# Patient Record
Sex: MALE | Race: WHITE | NOT HISPANIC OR LATINO | Employment: FULL TIME | URBAN - METROPOLITAN AREA
[De-identification: names, ages, dates, MRNs, and addresses within clinical notes are randomized per-mention and may not be internally consistent; named-entity substitution may affect disease eponyms.]

---

## 2018-02-27 ENCOUNTER — DOCUMENTATION ONLY (OUTPATIENT)
Dept: INTERNAL MEDICINE | Facility: CLINIC | Age: 28
End: 2018-02-27

## 2018-02-27 ENCOUNTER — OFFICE VISIT (OUTPATIENT)
Dept: INTERNAL MEDICINE | Facility: CLINIC | Age: 28
End: 2018-02-27
Payer: COMMERCIAL

## 2018-02-27 ENCOUNTER — DOCUMENTATION ONLY (OUTPATIENT)
Dept: LAB | Facility: CLINIC | Age: 28
End: 2018-02-27

## 2018-02-27 VITALS
RESPIRATION RATE: 16 BRPM | HEIGHT: 69 IN | HEART RATE: 64 BPM | BODY MASS INDEX: 27.85 KG/M2 | WEIGHT: 188 LBS | OXYGEN SATURATION: 97 % | DIASTOLIC BLOOD PRESSURE: 79 MMHG | SYSTOLIC BLOOD PRESSURE: 128 MMHG

## 2018-02-27 DIAGNOSIS — M13.0 POLYARTHRITIS: ICD-10-CM

## 2018-02-27 DIAGNOSIS — M13.0 POLYARTHRITIS: Primary | ICD-10-CM

## 2018-02-27 LAB
ALBUMIN SERPL-MCNC: 4.3 G/DL (ref 3.4–5)
ALP SERPL-CCNC: 68 U/L (ref 40–150)
ALT SERPL W P-5'-P-CCNC: 46 U/L (ref 0–70)
ANION GAP SERPL CALCULATED.3IONS-SCNC: 6 MMOL/L (ref 3–14)
AST SERPL W P-5'-P-CCNC: 28 U/L (ref 0–45)
BASOPHILS # BLD AUTO: 0 10E9/L (ref 0–0.2)
BASOPHILS NFR BLD AUTO: 0.6 %
BILIRUB SERPL-MCNC: 0.9 MG/DL (ref 0.2–1.3)
BUN SERPL-MCNC: 20 MG/DL (ref 7–30)
CALCIUM SERPL-MCNC: 9.2 MG/DL (ref 8.5–10.1)
CHLORIDE SERPL-SCNC: 103 MMOL/L (ref 94–109)
CK SERPL-CCNC: 214 U/L (ref 30–300)
CO2 SERPL-SCNC: 27 MMOL/L (ref 20–32)
CREAT SERPL-MCNC: 0.9 MG/DL (ref 0.66–1.25)
CRP SERPL-MCNC: 6 MG/L (ref 0–8)
DIFFERENTIAL METHOD BLD: NORMAL
EOSINOPHIL # BLD AUTO: 0 10E9/L (ref 0–0.7)
EOSINOPHIL NFR BLD AUTO: 0 %
ERYTHROCYTE [DISTWIDTH] IN BLOOD BY AUTOMATED COUNT: 11.4 % (ref 10–15)
ERYTHROCYTE [SEDIMENTATION RATE] IN BLOOD BY WESTERGREN METHOD: 7 MM/H (ref 0–15)
GFR SERPL CREATININE-BSD FRML MDRD: >90 ML/MIN/1.7M2
GLUCOSE SERPL-MCNC: 120 MG/DL (ref 70–99)
HCT VFR BLD AUTO: 45.4 % (ref 40–53)
HGB BLD-MCNC: 15.2 G/DL (ref 13.3–17.7)
IMM GRANULOCYTES # BLD: 0 10E9/L (ref 0–0.4)
IMM GRANULOCYTES NFR BLD: 0.3 %
LYMPHOCYTES # BLD AUTO: 1.6 10E9/L (ref 0.8–5.3)
LYMPHOCYTES NFR BLD AUTO: 25.2 %
MCH RBC QN AUTO: 32.8 PG (ref 26.5–33)
MCHC RBC AUTO-ENTMCNC: 33.5 G/DL (ref 31.5–36.5)
MCV RBC AUTO: 98 FL (ref 78–100)
MONOCYTES # BLD AUTO: 0.5 10E9/L (ref 0–1.3)
MONOCYTES NFR BLD AUTO: 8.6 %
NEUTROPHILS # BLD AUTO: 4.1 10E9/L (ref 1.6–8.3)
NEUTROPHILS NFR BLD AUTO: 65.3 %
NRBC # BLD AUTO: 0 10*3/UL
NRBC BLD AUTO-RTO: 0 /100
PLATELET # BLD AUTO: 187 10E9/L (ref 150–450)
POTASSIUM SERPL-SCNC: 3.5 MMOL/L (ref 3.4–5.3)
PROT SERPL-MCNC: 7.8 G/DL (ref 6.8–8.8)
RBC # BLD AUTO: 4.63 10E12/L (ref 4.4–5.9)
SODIUM SERPL-SCNC: 137 MMOL/L (ref 133–144)
URATE SERPL-MCNC: 4.7 MG/DL (ref 3.5–7.2)
WBC # BLD AUTO: 6.3 10E9/L (ref 4–11)

## 2018-02-27 RX ORDER — SULFASALAZINE 500 MG/1
1000 TABLET ORAL 3 TIMES DAILY PRN
COMMUNITY
End: 2018-02-27

## 2018-02-27 RX ORDER — SULFASALAZINE 500 MG/1
1000 TABLET ORAL 3 TIMES DAILY PRN
Qty: 90 TABLET | Refills: 1 | Status: SHIPPED | OUTPATIENT
Start: 2018-02-27 | End: 2018-04-10

## 2018-02-27 RX ORDER — DICLOFENAC SODIUM 25 MG/1
75 TABLET, DELAYED RELEASE ORAL 2 TIMES DAILY PRN
Qty: 60 TABLET | Refills: 1 | Status: SHIPPED | OUTPATIENT
Start: 2018-02-27 | End: 2018-04-10

## 2018-02-27 ASSESSMENT — PAIN SCALES - GENERAL: PAINLEVEL: MILD PAIN (3)

## 2018-02-27 NOTE — MR AVS SNAPSHOT
After Visit Summary   2/27/2018    Jermaine Acosta    MRN: [id number]           Patient Information     Date Of Birth          1990        Visit Information        Provider Department      2/27/2018 8:50 AM Sriram Cormier DO M [facility]        Today's Diagnoses     Polyarthritis    -  1      Care Instructions    1. Labs today. Please go to the lab on the first floor before you leave today.   2. Rheumatology referral. Rheumatology [phone] ([facility], 3rd Floor W)  3. Follow up in 1 month. [phone]          Follow-ups after your visit        Additional Services     RHEUMATOLOGY REFERRAL       Your provider has referred you to: PREFERRED PROVIDERS:    Please be aware that coverage of these services is subject to the terms and limitations of your health insurance plan.  Call member services at your health plan with any benefit or coverage questions.      Please bring the following with you to your appointment:    (1) Any X-Rays, CTs or MRIs which have been performed.  Contact the facility where they were done to arrange for  prior to your scheduled appointment.    (2) List of current medications   (3) This referral request   (4) Any documents/labs given to you for this referral                  Follow-up notes from your care team     Return in about 1 month (around 3/27/2018).      Your next 10 appointments already scheduled     Feb 27, 2018 10:30 AM CST   LAB with [facility] ([facility])    [address] [place] [address] [phone]           Please do not eat 10-12 hours before your appointment if you are coming in fasting for labs on lipids, cholesterol, or glucose (sugar). This does not apply to pregnant women. Water, hot tea and black coffee (with nothing added) are okay. Do not drink other fluids, diet soda or chew gum.            Apr 10, 2018  8:50 AM CDT   (Arrive by 8:35 AM)   Return Visit with Sriram  DO Genia   [facility] ([facility])    [address]  4th Floor  [facility] [address] [phone]              Future tests that were ordered for you today     Open Future Orders        Priority Expected Expires Ordered    CBC with platelets differential Routine 2/27/2018 3/13/2018 2/27/2018    Comprehensive metabolic panel Routine 2/27/2018 3/13/2018 2/27/2018    CK total Routine 2/27/2018 2/27/2019 2/27/2018    Rheumatoid factor Routine 2/27/2018 2/27/2019 2/27/2018    Cyclic Citrullinated Peptide Antibody IgG Routine 2/27/2018 2/27/2019 2/27/2018    Erythrocyte sedimentation rate Routine 2/27/2018 2/27/2019 2/27/2018    CRP inflammation Routine 2/27/2018 2/27/2019 2/27/2018    Uric acid Routine 2/27/2018 2/27/2019 2/27/2018            Who to contact     Please call your clinic at [phone] to:    Ask questions about your health    Make or cancel appointments    Discuss your medicines    Learn about your test results    Speak to your doctor            Additional Information About Your Visit        [employer] Information     [employer] gives you secure access to your electronic health record. If you see a primary care provider, you can also send messages to your care team and make appointments. If you have questions, please call your primary care clinic.  If you do not have a primary care provider, please call [phone] and they will assist you.      [employer] is an electronic gateway that provides easy, online access to your medical records. With [employer], you can request a clinic appointment, read your test results, renew a prescription or communicate with your care team.     To access your existing account, please contact your [facility] or call [phone] for assistance.        Care EveryWhere ID     This is your Care EveryWhere ID. This could be used by other organizations to access your [facility]  "records  [id number]        Your Vitals Were     Pulse Respirations Height Pulse Oximetry BMI (Body Mass Index)       64 16 1.759 m (5' 9.25\") 97% 27.56 kg/m2        Blood Pressure from Last 3 Encounters:   02/27/18 128/79    Weight from Last 3 Encounters:   02/27/18 85.3 kg (188 lb)              We Performed the Following     RHEUMATOLOGY REFERRAL          Where to get your medicines      These medications were sent to [place], [place] - [address]  [address], [facility] [address]    Hours:  TRANSPLANT PHONE NUMBER [phone] Phone:  [phone]     diclofenac 25 MG EC tablet    sulfaSALAzine 500 MG tablet          Primary Care Provider Office Phone # Fax #    Sriram Cormier [phone]       [facility] [address] [facility] [address]  [facility] [address]        Equal Access to Services     RIVKA LOCKWOOD AH: Hadii aad ku hadasho Soomaali, waaxda luqadaha, qaybta kaalmada adeegyada, waxay idiin hayaan shelby keita. So [facility] [phone].    ATENCIÓN: Si judsonla espriley, tiene a zacarias disposición servicios gratuitos de asistencia lingüística. Llame al [phone].    We comply with applicable federal civil rights laws and [place] laws. We do not discriminate on the basis of race, color, national origin, age, disability, sex, sexual orientation, or gender identity.            Thank you!     Thank you for choosing [facility]  for your care. Our goal is always to provide you with excellent care. Hearing back from our patients is one way we can continue to improve our services. Please take a few minutes to complete the written survey that you may receive in the mail after your visit with us. Thank you!             Your Updated Medication List - Protect others around you: Learn how to safely use, store and throw away your medicines at www.disposemymeds.org.          This list is accurate as of 2/27/18 10:19 AM.  " Always use your most recent med list.                   Brand Name Dispense Instructions for use Diagnosis    diclofenac 25 MG EC tablet    VOLTAREN    60 tablet    Take 3 tablets (75 mg) by mouth 2 times daily as needed for moderate pain 75 mg DR tablets    Polyarthritis       sulfaSALAzine 500 MG tablet    AZULFIDINE    90 tablet    Take 2 tablets (1,000 mg) by mouth 3 times daily as needed    Polyarthritis

## 2018-02-27 NOTE — OR NURSING
Rapid Response Epic Documentation     Situation:  Vasovagal with blood draw.    Objective: Upon RN arrival, the patient was already transferred to a recliner, alert, oriented, slightly pale, diaphoretic. Staff already gave patient orange juice and he reports feeling much better.    Assessment: Pulse: 64, BP: 128/75 right arm, O2: 96 % RA.    Plan: Hydrate and eat before future lab draws if allowed. Recline during lab draw.    Location: [facility] 1st floor    Disposition:      Stable (D/C home)    Protocol Used:     none

## 2018-02-27 NOTE — PATIENT INSTRUCTIONS
1. Labs today. Please go to the lab on the first floor before you leave today.   2. Rheumatology referral. Rheumatology [phone] ([facility], 3rd Floor W)  3. Follow up in 1 month. [phone]

## 2018-02-27 NOTE — NURSING NOTE
Chief Complaint   Patient presents with     Physical     Patient is here for annual physical     Cristina Ribeiro CMA 8:46 AM on 2/27/2018.

## 2018-02-27 NOTE — PROGRESS NOTES
[facility] Note  Date of Service: February 27, 2018    Patient: Jermaine Acosta  MRN: [id number]  PCP: Sriram Cormier    Reason for visit: Joint pains     Subjective  Jermaine Acosta is a 27 year old male with history of seronegative spondylarthritis that presents to establish care.  Patient recently moved from [place], [place] in August 2017.  He reports a history of polyarthritis starting in the spring 2013.  His first symptom was right thumb swelling, which progressed to swelling and pain of his bilateral wrists, MCP joints, and PIP joints.  He has also had intermittent right elbow swelling, as well as left MTP pain and swelling.  He denies any history of hip, knee, ankle, or SI joint pains.  Patient has been following with a rheumatologist at [facility], Dr. Patricia Omer, and has been maintained on diclofenac 75 mg twice daily and sulfasalazine 1000 mg 3 times daily for presumed seronegative spondylarthritis.  Patient reports he is also being worked up for possible psoriasis.  This workup was being done by Dr. Arash Daley at [facility].  Today, the patient reports that his joint symptoms are overall well controlled.  He is requesting refills of his diclofenac and sulfasalazine.  The patient has also not yet established with a rheumatologist here, and is requesting a referral.  He has no other concerns or complaints.    The patient brought in some records to his clinic appointment today.  These records are from 2015.  Lab testing from 2015 showed WBC 4.2, hemoglobin 14.5, platelets 170, MCV 96.  ESR 3 and CRP 5.9.  His renal function and liver function tests were normal in 2015.  He has a previous negative ERNST, RF, anti-CCP antibody, negative Lyme antibody, and negative HLA-B27 testing.    No past medical history on file.    Past Surgical History:   Procedure Laterality Date     right ankle fracture       wisdom teeth removal         Current Outpatient Prescriptions  "  Medication Sig Dispense Refill     DICLOFENAC SODIUM PO Take 75 mg by mouth 2 times daily as needed for moderate pain 75 mg DR tablets       sulfaSALAzine (AZULFIDINE) 500 MG tablet Take 1,000 mg by mouth 3 times daily as needed         Family History   Problem Relation Age of Onset     Rheumatoid Arthritis Father        Social History     Social History     Marital status:      Spouse name: N/A     Number of children: N/A     Years of education: N/A     Occupational History     Not on file.     Social History Main Topics     Smoking status: Never Smoker     Smokeless tobacco: Never Used     Alcohol use Not on file     Drug use: Not on file     Sexual activity: Not on file     Other Topics Concern     Not on file     Social History Narrative     No narrative on file       Review of Systems  Constitutional: No fevers, chills, night sweats, or weight changes.  HEENT: No hearing changes, sore throat, runny nose, or sinus problems. No sicca symptoms or oral ulcers.   CV: No chest pain, palpitations, or leg swelling.  RESP: No shortness of breath, cough, or wheezing.  GI: No dyspepsia, nausea, vomiting, diarrhea, constipation, or abdominal pain.  : No hematuria, dysuria, or urgency.  MSK: As above.   SKIN: +Dry skin. No rashes, hair loss, or itching. No raynaud's.   NEURO: No headaches, weakness, vision changes, or paraesthesias.   ENDO: No temperature intolerance.  HEME: No bleeding, bruising, or lymphadenopathy.  Psych: No mood changes.     Objective  /79 (BP Location: Right arm, Patient Position: Sitting, Cuff Size: Adult Large)  Pulse 64  Resp 16  Ht 1.759 m (5' 9.25\")  Wt 85.3 kg (188 lb)  SpO2 97%  BMI 27.56 kg/m2    Physical Exam  General: Sitting in the chair, conversing normally, NAD, appears stated age. Non-toxic appearing.   HEENT: NC/AT, MMM, PERRL, EOMs intact, sclera anicteric. Oropharynx appears clear.  Neck: Supple, no LAD, no JVD.   Resp: Non-labored. CTAB, no wheezes or crackles. "   CV: RRR, no MRG.   Abd: Soft, NT/ND, no guarding/rebound.   Ext: No LE edema. Radial pulses +2 and equal bilaterally. Swelling/erythema of R 1st MCP/PIP, 2nd MCP/PIP, and 4th MCP/PIP joints. Swelling/erythema of L 1st MCP/PIP joint. No synovitis or warmth. He has enlargement of the L 3rd MTP joint without redness. No wrist, knee, elbow, or ankle swelling/redness/warmth.  Neuro: Alert and interacting appropriately. CNs II-XII grossly intact. Moving all extremities spontaneously.  Skin: Warm and dry. Some peeling skin noted over the distal fingertips consistent with dry skin. No jaundice.   Psych: Appropriate affect.     Assessment and Plan  Jermaine Acosta is a 27 year old male with history of seronegative spondylarthritis that presents to [facility].    Jermaine was seen today for physical.    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  Workup for this has essentially been negative.  Differential includes seronegative rheumatoid arthritis, spondylarthritis, psoriatic arthritis, mixed connective tissue disorder, or less likely lupus.  Patient reports improvement while on diclofenac and sulfasalazine, both of which were started by an outside rheumatologist at [facility].  He is requesting refills of these medications today.  Will assess repeat labs today, including CBC with differential, comprehensive metabolic panel, CK, rheumatoid factor, CCP antibody, ESR, CRP, and uric acid.  Will also refer to rheumatology for further management.  Diclofenac and sulfasalazine prescriptions were refilled today.  Will have patient follow-up in one month.  -     RHEUMATOLOGY REFERRAL  -     CBC with platelets differential; Future  -     Comprehensive metabolic panel; Future  -     CK total; Future  -     Rheumatoid factor; Future  -     Cyclic Citrullinated Peptide Antibody IgG; Future  -     Erythrocyte sedimentation rate; Future  -      CRP inflammation; Future  -     diclofenac (VOLTAREN) 25 MG EC tablet; Take 3 tablets (75 mg) by mouth 2 times daily as needed for moderate pain 75 mg DR tablets  -     sulfaSALAzine (AZULFIDINE) 500 MG tablet; Take 2 tablets (1,000 mg) by mouth 3 times daily as needed  -     Uric acid; Future    Health maintenance: UTD, declined flu shot.  Blood pressure check: WNL  Follow up: 1 month    Plan of care discussed with the patient who agrees with and understands the plan. All questions answered at time of encounter.     Patient seen and discussed with Dr. Bell who agrees with my assessment and plan.     Sriram Cormier, DO  Internal Medicine PGY-3  [phone]    Teaching Physician Note:  I was present during the visit and the patient was seen and examined by me.   I discussed the case with the resident and agree with the note as documented by the resident with the following exceptions:  None.    Pat Bell M.D.  Internal Medicine   pager [phone]

## 2018-02-28 LAB — CCP AB SER IA-ACNC: 1 U/ML

## 2018-03-04 LAB — RHEUMATOID FACT SER NEPH-ACNC: <20 IU/ML (ref 0–20)

## 2018-04-03 ENCOUNTER — TELEPHONE (OUTPATIENT)
Dept: RHEUMATOLOGY | Facility: CLINIC | Age: 28
End: 2018-04-03

## 2018-04-03 NOTE — TELEPHONE ENCOUNTER
Called patient's Home number on file [phone] (home). Left a message X1 asking patient to call back regarding the referral we received. An intake form needs to be completed over the phone and records are needed from outside facilities. Awaiting a call back from the patient.  Amy Oglesby CMA  4/3/2018 1:01 PM

## 2018-04-03 NOTE — TELEPHONE ENCOUNTER
Pt returning call to FORREST Reyes. CMA was not available.    Please callback pt at [phone]. OK to [facility].

## 2018-04-03 NOTE — TELEPHONE ENCOUNTER
----- Message from Valdemar Pride sent at 2/21/2018  3:02 PM CST -----  Regarding: New Patient Process, DX: Arthritis (unspecified)  Hi Team,    Hope you all are having a beautiful week!    Patient called to initiate New Patient Process for DX: Arthritis (unspecified). Please follow-up.     Kind Regards    Valdemar     Please DO NOT send this message and/or reply back to sender. Call Center Representatives DO NOT respond to messages.

## 2018-04-05 NOTE — TELEPHONE ENCOUNTER
"Pt calling for \"Amy\". Pt returning Amy's call. Amy can reach pt at work today before noon or after 2:30 at [phone].  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    "

## 2018-04-06 NOTE — TELEPHONE ENCOUNTER
Spoke to patient regarding the request to be seen. Patient stated that he has seen 3 other Rheumatologists who were unable to give him a definitive diagnosis. I explained to the patient that our providers typically do not see 4th opinions and will need to see the records from the previous Rheumatologists that he has seen in order to make a decision to offer an appointment. Patient verbalized understanding. Release opf information form has been emailed to patient at [email or web address].  Amy Oglesby CMA  4/6/2018 9:24 AM

## 2018-04-10 ENCOUNTER — OFFICE VISIT (OUTPATIENT)
Dept: INTERNAL MEDICINE | Facility: CLINIC | Age: 28
End: 2018-04-10
Payer: COMMERCIAL

## 2018-04-10 VITALS
HEART RATE: 65 BPM | WEIGHT: 191.5 LBS | SYSTOLIC BLOOD PRESSURE: 134 MMHG | BODY MASS INDEX: 28.08 KG/M2 | DIASTOLIC BLOOD PRESSURE: 76 MMHG

## 2018-04-10 DIAGNOSIS — R73.9 BLOOD GLUCOSE ELEVATED: ICD-10-CM

## 2018-04-10 DIAGNOSIS — M13.0 POLYARTHRITIS: Primary | ICD-10-CM

## 2018-04-10 DIAGNOSIS — Z79.1 NSAID LONG-TERM USE: ICD-10-CM

## 2018-04-10 RX ORDER — SUCRALFATE 1 G/1
1 TABLET ORAL 2 TIMES DAILY
Qty: 120 TABLET | Refills: 0 | Status: CANCELLED | OUTPATIENT
Start: 2018-04-10

## 2018-04-10 RX ORDER — SULFASALAZINE 500 MG/1
1000 TABLET ORAL 3 TIMES DAILY PRN
Qty: 90 TABLET | Refills: 1 | Status: SHIPPED | OUTPATIENT
Start: 2018-04-10 | End: 2018-05-24

## 2018-04-10 RX ORDER — DICLOFENAC SODIUM 25 MG/1
75 TABLET, DELAYED RELEASE ORAL 2 TIMES DAILY PRN
Qty: 60 TABLET | Refills: 1 | Status: SHIPPED | OUTPATIENT
Start: 2018-04-10 | End: 2018-05-24

## 2018-04-10 ASSESSMENT — PAIN SCALES - GENERAL: PAINLEVEL: MILD PAIN (3)

## 2018-04-10 NOTE — PROGRESS NOTES
[facility] Note  Date of Service: April 10, 2018    Patient: Jermaine Acosta  MRN: [id number]  PCP: Sriram Cormier    Reason for visit: Follow up     Subjective  Jermaine Acosta is a 27 year old male with history of polyarthritis thought to be seronegative spondylarthritis that presents for follow-up.  Patient last seen in February 2018.  At that time, we performed lab testing including CBC, CMP, CK, rheumatoid factor, CCP, ESR, CRP, and uric acid that were all within normal limits. Referred the patient to rheumatology, however he unfortunately has not been able to be seen yet.  The patient is here today to discuss this.  He reports that he submitted his old medical records, however he was informed by rheumatology that they never received these.  The patient is frustrated about this, and inquires how we should proceed next.  He is also running out of his diclofenac and sulfasalazine, and needs refills of these today.  He continues to have intermittent joint swelling and stiffness involving his bilateral MCP and PIP joints.  Review of systems is otherwise negative.    Past Medical History:   Diagnosis Date     Seronegative arthritis        Past Surgical History:   Procedure Laterality Date     right ankle fracture       wisdom teeth removal         Current Outpatient Prescriptions   Medication Sig Dispense Refill     diclofenac (VOLTAREN) 25 MG EC tablet Take 3 tablets (75 mg) by mouth 2 times daily as needed for moderate pain 75 mg DR tablets 60 tablet 1     sulfaSALAzine (AZULFIDINE) 500 MG tablet Take 2 tablets (1,000 mg) by mouth 3 times daily as needed 90 tablet 1     [DISCONTINUED] diclofenac (VOLTAREN) 25 MG EC tablet Take 3 tablets (75 mg) by mouth 2 times daily as needed for moderate pain 75 mg DR tablets 60 tablet 1       Family History   Problem Relation Age of Onset     Rheumatoid Arthritis Father        Social History     Social History     Marital status:      Spouse name: N/A      Number of children: N/A     Years of education: N/A     Occupational History     Not on file.     Social History Main Topics     Smoking status: Never Smoker     Smokeless tobacco: Never Used     Alcohol use Yes      Comment: 3x/week      Drug use: No     Sexual activity: Yes     Partners: Female     Other Topics Concern     Not on file     Social History Narrative       Review of Systems  10 point ROS negative other than in the HPI above.     Objective  /76  Pulse 65  Wt 86.9 kg (191 lb 8 oz)  BMI 28.08 kg/m2    Physical Exam  General: Sitting in the chair, conversing normally, NAD, appears stated age..   Resp: Non-labored. CTAB, no wheezes or crackles.   CV: RRR, no MRG.   Skin: Warm and dry. No obvious rashes. No jaundice.  Psych: Appropriate affect.     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     Jermaine was seen today for referral.    Diagnoses and all orders for this visit:    Polyarthritis: Primarily involves the bilateral MCP and PIP joints.  Patient also reports intermittent MTP involvement.  Differential includes seronegative spondylarthritis, mixed connective tissue disease, seronegative rheumatoid arthritis, psoriatic arthritis, less likely lupus.  Initial autoimmune workup negative.  As the patient is having difficulty establishing with a rheumatologist, I will message the rheumatology clinic today to see if the patient can be seen.  The patient was also provided with a list of external rheumatologists.  He will inquire to see if these providers are within his network.  For now, will refill his diclofenac and sulfasalazine.  -     diclofenac (VOLTAREN) 25 MG EC tablet; Take 3 tablets (75 mg) by mouth 2 times daily as needed for moderate pain 75 mg DR tablets  -     sulfaSALAzine (AZULFIDINE) 500 MG tablet; Take 2 tablets (1,000 mg) by mouth 3 times daily as needed    Blood  glucose elevated: Blood glucose elevated at 120 on previous blood work.  Patient reports this is because he was nonfasting.  He denies any symptoms of diabetes.    NSAID long-term use: We discussed the potential complications related to chronic NSAID use, including gastritis and nephropathy.  Patient was encouraged to take diclofenac with food and with plenty of fluids.  We discussed the use of Carafate and acid reducing agents, however the patient is not interested in taking these at the present time as he has no symptoms.  We will continue to monitor for now.    Health maintenance: UTD  Blood pressure check: WNL  Follow up: PRN    Plan of care discussed with the patient who agrees with and understands the plan. All questions answered at time of encounter.     Patient discussed with Dr. Bell who agrees with my assessment and plan.     Sriram Cormier DO  Internal Medicine PGY-3  [phone]    Teaching Physician Note:    While the patient was in clinic, I reviewed the patient's medical history and results, the resident's findings on physical examination, and the patient's diagnosis and treatment plan with the resident.  I agree with the information documented with the following exceptions: none.    Pat Bell M.D.  Internal Medicine  Primary Care Center   pager [phone]

## 2018-04-10 NOTE — TELEPHONE ENCOUNTER
Signed BRENDEN received from patient via email for [facility] and faxed.   Amy Oglesby CMA  4/10/2018 11:17 AM

## 2018-04-10 NOTE — PATIENT INSTRUCTIONS
1. Refills on medications sent to pharmacy.  2. I will message Rheumatology to see if you can be seen sooner.

## 2018-04-10 NOTE — MR AVS SNAPSHOT
After Visit Summary   4/10/2018    Jermaine Acosta    MRN: [id number]           Patient Information     Date Of Birth          1990        Visit Information        Provider Department      4/10/2018 8:50 AM Sriram Cormier DO [facility]        Today's Diagnoses     Polyarthritis    -  1    Blood glucose elevated        NSAID long-term use          Care Instructions    1. Refills on medications sent to pharmacy.  2. I will message Rheumatology to see if you can be seen sooner.          Follow-ups after your visit        Follow-up notes from your care team     Return if symptoms worsen or fail to improve.      Who to contact     Please call your clinic at [phone] to:    Ask questions about your health    Make or cancel appointments    Discuss your medicines    Learn about your test results    Speak to your doctor            Additional Information About Your Visit        Backdoorhart Information     Data[employer]ia gives you secure access to your electronic health record. If you see a primary care provider, you can also send messages to your care team and make appointments. If you have questions, please call your primary care clinic.  If you do not have a primary care provider, please call [phone] and they will assist you.      [employer]ia is an electronic gateway that provides easy, online access to your medical records. With [employer], you can request a clinic appointment, read your test results, renew a prescription or communicate with your care team.     To access your existing account, please contact your [facility] or call [phone] for assistance.        Care EveryWhere ID     This is your Care EveryWhere ID. This could be used by other organizations to access your [place] medical records  [id number]        Your Vitals Were     Pulse BMI (Body Mass Index)                65 28.08 kg/m2           Blood Pressure from Last 3 Encounters:   04/10/18 134/76    02/27/18 128/79    Weight from Last 3 Encounters:   04/10/18 86.9 kg (191 lb 8 oz)   02/27/18 85.3 kg (188 lb)              Today, you had the following     No orders found for display         Where to get your medicines      These medications were sent to [id number] - [place], [place] - [facility] [address] [address]     Phone:  [phone]     diclofenac 25 MG EC tablet    sulfaSALAzine 500 MG tablet          Primary Care Provider Office Phone # Fax #    Sriram Cormier [phone]       [facility] [address] [facility] [address]  [facility] [address]        Equal Access to Services     RIVKA LOCKWOOD : Alexander fineo Luna, waaxda luqadaha, qaybta kaalmada adeegyada, willard keita. So [facility] [phone].    ATENCIÓN: Si habla español, tiene a zacarias disposición servicios gratuitos de asistencia lingüística. [facility] [phone].    We comply with applicable federal civil rights laws and [place] laws. We do not discriminate on the basis of race, color, national origin, age, disability, sex, sexual orientation, or gender identity.            Thank you!     Thank you for choosing [facility]  for your care. Our goal is always to provide you with excellent care. Hearing back from our patients is one way we can continue to improve our services. Please take a few minutes to complete the written survey that you may receive in the mail after your visit with us. Thank you!             Your Updated Medication List - Protect others around you: Learn how to safely use, store and throw away your medicines at www.disposemymeds.org.          This list is accurate as of 4/10/18  9:39 AM.  Always use your most recent med list.                   Brand Name Dispense Instructions for use Diagnosis    diclofenac 25 MG EC tablet    VOLTAREN    60 tablet    Take 3 tablets (75 mg) by mouth 2 times daily as needed  for moderate pain 75 mg DR tablets    Polyarthritis       sulfaSALAzine 500 MG tablet    AZULFIDINE    90 tablet    Take 2 tablets (1,000 mg) by mouth 3 times daily as needed    Polyarthritis

## 2018-05-10 NOTE — TELEPHONE ENCOUNTER
[facility] Call Center    Phone Message    May a detailed message be left on voicemail: yes    Reason for Call: Other: Patient called to get a status update on where he is in the New Patient Process. Patient has not heard anything back and would like to know if he's going to be accepted into the clinic or if he needs to start looking for an outside provider.      Action Taken: Message routed to:  [facility] Rheum

## 2018-05-11 ENCOUNTER — MYC MEDICAL ADVICE (OUTPATIENT)
Dept: INTERNAL MEDICINE | Facility: CLINIC | Age: 28
End: 2018-05-11

## 2018-05-11 NOTE — TELEPHONE ENCOUNTER
Pt established care on 2/27/18 with Dr. Cormier in the Primary Care Clinic with Dr. Pat Bell precepting. An order/referral was placed by Dr. Cormier (and authorized by Dr. Bell) at that visit for the Pt to see Rheumatology and was visible in Epic. I checked that Pt out on 2/27/18 and called the call center Rheumatology line. Per Rheumatology's protocol, they sent a message to the clinic to initiate the New Pt process. I noted this in the comments of the order, so that we would know the process has begun. I even entered the external record information for easy record gathering. The Pt signed the ROIs during his appointment on 2/27/18. See the order and notes below. Please review the following order of events and encounters. Rheumatology apparently did not get back to the patient after receiving his records and gave him wrong information when he called back to inquire about his status. Amy should have reviewed her previous notes and looked in his chart for a referral from the PCP prior to misinforming the patient on May 11, 2018 and sending him in circles, frustrated. She also should have seen this referral back when she called him for the first time on April 3rd.    -Padmaja Rodriguez, Primary Care Clinic Coordinator    RHEUMATOLOGY REFERRAL for Jermaine Acosta [[id number]] (Routine) Needs Scheduling     Authorized by Pat Bell MD [id number]    Diagnosis: Polyarthritis    Comments   Your provider has referred you to: PREFERRED PROVIDERS:  Please be aware that coverage of these services is subject to the terms and limitations of your health insurance plan. Call member services at your health plan with any benefit or coverage questions.   Please bring the following with you to your appointment:  (1) Any X-Rays, CTs or MRIs which have been performed. Contact the facility where they were done to arrange for  prior to your scheduled appointment.   (2) List of current medications   (3) This referral request  "  (4) Any documents/labs given to you for this referral     Notes   Msg sent to clinic 2/27/18   Pt signed ROIs 2/27/18- records coming from:  1. Arash Daley MD (in [id number])  Rheumatology/Arthritis  [facility]  [address] - [address]th [place] [address]  Ph. [phone]  Fax. [phone]  2. Patricia Omer MD (2016 and prior)  [facility]  [address] [facility] - [address] [address]  Ph. [phone]     Here are the documented conversations, beginning with the most recent:    To: [facility] NURSING STAFF-      From: Jermaine Acosta      Created: 5/11/2018 2:25 PM        To whom this may concern,  I saw Dr. Sriram Cormier I believe 2 times this year, a DO finishing up his residency program. I scheduled these appointments to get a referral to be seen at the [facility]'s Rheumatology office. I has told that my doctor agreed to send my referral, but the rheumatology office has not received it. I am asking you please send one. Their fax number is [phone]. Thank you.   Jermaine \"Raul\"Dave          Telephone Encounter  Encounter Date: 4/3/2018  Amy Oglesby CMA        The Rheumatology Clinic is only accepting physician referrals at this time. If the patient would like to be seen in our clinic, they will need to contact their physician to obtain a referral and send it to the clinic. Left a message of this information.   Amy Oglesby CMA  5/11/2018 2:09 PM      Telephone Encounter  Encounter Date: 4/3/2018  Valdemar Pride        [facility] Call Center     Phone Message     May a detailed message be left on voicemail: yes     Reason for Call: Other: Patient called to get a status update on where he is in the New Patient Process. Patient has not heard anything back and would like to know if he's going to be accepted into the clinic or if he needs to start looking for an outside provider.       Action Taken: Message routed to:  [facility] " ([facility]): Rheum        Telephone Encounter  Encounter Date: 4/3/2018  Amy Oglesby CMA        Signed BRENDEN received from patient via email for [facility] ePatientFinder and [facility] and faxed.   Amy Oglesby CMA  4/10/2018 11:17 AM        Nursing Note  Encounter Date: 4/10/2018  Divina Samuels LPN             Chief Complaint   Patient presents with     Referral       Rheumatology             Electronically signed by Divina Samuels LPN at 4/10/2018  9:06 AM     Patient Instructions  Encounter Date: 4/10/2018  Sriram Cormier DO   Student in organized health care education/training program     1. Refills on medications sent to pharmacy.  2. I will message Rheumatology to see if you can be seen sooner.      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  Patient last seen in February 2018.  At that time, we performed lab testing including CBC, CMP, CK, rheumatoid factor, CCP, ESR, CRP, and uric acid that were all within normal limits. Referred the patient to rheumatology, however he unfortunately has not been able to be seen yet.  The patient is here today to discuss this.  He reports that he submitted his old medical records, however he was informed by rheumatology that they never received these.  The patient is frustrated about this, and inquires how we should proceed next.  He is also running out of his diclofenac and sulfasalazine, and needs refills of these today.  He continues to have intermittent joint swelling and stiffness involving his bilateral MCP and PIP joints.  Review of systems is  otherwise negative...  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      Jermaine was seen today for referral.     Diagnoses and all orders for this visit:     Polyarthritis: Primarily involves the bilateral MCP and PIP joints.  Patient also reports intermittent MTP involvement.  Differential includes seronegative spondylarthritis, mixed connective tissue disease, seronegative rheumatoid arthritis, psoriatic arthritis, less likely lupus.  Initial autoimmune workup negative.  As the patient is having difficulty establishing with a rheumatologist, I will message the rheumatology clinic today to see if the patient can be seen.  The patient was also provided with a list of external rheumatologists.  He will inquire to see if these providers are within his network.  For now, will refill his diclofenac and sulfasalazine.  -     diclofenac (VOLTAREN) 25 MG EC tablet; Take 3 tablets (75 mg) by mouth 2 times daily as needed for moderate pain 75 mg DR tablets  -     sulfaSALAzine (AZULFIDINE) 500 MG tablet; Take 2 tablets (1,000 mg) by mouth 3 times daily as needed  Patient discussed with Dr. Bell who agrees with my assessment and plan.      Sriram Cormier DO  Internal Medicine PGY-3  [phone]     Teaching Physician Note:     While the patient was in clinic, I reviewed the patient's medical history and results, the resident's findings on physical examination, and the patient's diagnosis and treatment plan with the resident.  I agree with the information documented with the following exceptions: none.     Pat Bell M.D.  Internal Medicine  Primary Care Center   pager [phone]         Electronically signed by aPt Bell MD at 4/15/2018 12:49 PM               Telephone Encounter  Encounter Date: 4/3/2018  Amy Oglesby CMA       Spoke to patient regarding the request to be seen. Patient stated  "that he has seen 3 other Rheumatologists who were unable to give him a definitive diagnosis. I explained to the patient that our providers typically do not see 4th opinions and will need to see the records from the previous Rheumatologists that he has seen in order to make a decision to offer an appointment. Patient verbalized understanding. Release opf information form has been emailed to patient at [email or web address].  Amy Oglesby CMA  4/6/2018 9:24 AM            Electronically signed by Amy Oglesby CMA at 4/6/2018  9:29 AM     Telephone Encounter  Encounter Date: 4/3/2018  Bhavna Ramos, RN     Pt calling for \"Amy\". Pt returning Amy's call. Amy can reach pt at work today before noon or after 2:30 at [phone].  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         Electronically signed by Bhavna Ramos RN at 4/5/2018 11:13 AM     Telephone Encounter  Encounter Date: 4/3/2018  Sivan Sierra LPN      Pt returning call to FORREST Reyes. [facility] was not available.     Please callback pt at [phone]. OK to [facility].      Electronically signed by Sivan Sierra LPN at 4/3/2018  1:20 PM   Telephone Encounter  Encounter Date: 4/3/2018  Amy Oglesby CMA      Called patient's Home number on file [phone] (home). Left a message X1 asking patient to call back regarding the referral we received. An intake form needs to be completed over the phone and records are needed from outside facilities. Awaiting a call back from the patient.  Amy Oglesby [facility]  4/3/2018 1:01 PM          ----- Message from Valdemar Pride sent at 2/21/2018  3:02 PM CST -----  Regarding: New Patient Process, DX: Arthritis (unspecified)  Hi Team,     Hope you all are having a beautiful week!     Patient called to initiate New Patient Process for DX: Arthritis (unspecified). Please follow-up.      Kind Regards     Marisa            Rheumatology order notes:    \"Msg sent to clinic 2/27/18   Pt signed ROIs 2/27/18- records " "coming from:  1. Arash Dlaey MD (in [id number])  Rheumatology/Arthritis  [facility]  [address] - 5th [place] [address]  Ph. [phone]  Fax. [phone]  2. Patricia Omer MD (2016 and prior)  [facility] [place]  [address] [facility] - [address] [address]  Ph. [phone]\"  I called call center and gave them the rheumatology referral. The  said they sent a staff message to clinic and the clinic would contact the patient to initiate the new patient process. I informed the  that I was marking on the order notes where the patient had previous records and that he signed ROIs in clinic. -Padmaja Rodriguez, Primary Care Clinic Coordinator.  It appears that nothing was done until the patient called in on April 3rd and Valdemar sent another message.   Progress Notes  Encounter Date: 2/27/2018  Sriram Cormier, DO   Student in organized health care education/training program          [facility] Note  Date of Service: February 27, 2018     Patient: Jermaine Acosta  MRN: [id number]  PCP: Sriram Cormier     Reason for visit: Joint pains      Subjective  Jermaine Acosta is a 27 year old male with history of seronegative spondylarthritis that presents to [facility] care.  Patient recently moved from [place], [place] in August 2017.  He reports a history of polyarthritis starting in the spring 2013.  His first symptom was right thumb swelling, which progressed to swelling and pain of his bilateral wrists, MCP joints, and PIP joints.  He has also had intermittent right elbow swelling, as well as left MTP pain and swelling.  He denies any history of hip, knee, ankle, or SI joint pains.  Patient has been following with a rheumatologist at [facility], Dr. Patricia Omer, and has been maintained on diclofenac 75 mg twice daily and sulfasalazine 1000 mg 3 times daily for presumed seronegative spondylarthritis.  Patient reports he is also " being worked up for possible psoriasis.  This workup was being done by Dr. Arash Daley at [facility].  Today, the patient reports that his joint symptoms are overall well controlled.  He is requesting refills of his diclofenac and sulfasalazine.  The patient has also not yet established with a rheumatologist here, and is requesting a referral.  He has no other concerns or complaints.     The patient brought in some records to his clinic appointment today.  These records are from 2015.  Lab testing from 2015 showed WBC 4.2, hemoglobin 14.5, platelets 170, MCV 96.  ESR 3 and CRP 5.9.  His renal function and liver function tests were normal in 2015.  He has a previous negative ERNST, RF, anti-CCP antibody, negative Lyme antibody, and negative HLA-B27 testing.            Family History          Family History   Problem Relation Age of Onset     Rheumatoid Arthritis Father              Assessment and Plan  Jermaine Acosta is a 27 year old male with history of seronegative spondylarthritis that presents to [facility] care.     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  Workup for this has essentially been negative.  Differential includes seronegative rheumatoid arthritis, spondylarthritis, psoriatic arthritis, mixed connective tissue disorder, or less likely lupus.  Patient reports improvement while on diclofenac and sulfasalazine, both of which were started by an outside rheumatologist at [facility].  He is requesting refills of these medications today.  Will assess repeat labs today, including CBC with differential, comprehensive metabolic panel, CK, rheumatoid factor, CCP antibody, ESR, CRP, and uric acid.  Will also refer to rheumatology for further management.  Diclofenac and sulfasalazine prescriptions were refilled today.  Will have patient follow-up in one month.  -     RHEUMATOLOGY REFERRAL  -     CBC with  platelets differential; Future  -     Comprehensive metabolic panel; Future  -     CK total; Future  -     Rheumatoid factor; Future  -     Cyclic Citrullinated Peptide Antibody IgG; Future  -     Erythrocyte sedimentation rate; Future  -     CRP inflammation; Future  -     diclofenac (VOLTAREN) 25 MG EC tablet; Take 3 tablets (75 mg) by mouth 2 times daily as needed for moderate pain 75 mg DR tablets  -     sulfaSALAzine (AZULFIDINE) 500 MG tablet; Take 2 tablets (1,000 mg) by mouth 3 times daily as needed  -     Uric acid; Future        Patient seen and discussed with Dr. Bell who agrees with my assessment and plan.      Sriram Cormier DO  Internal Medicine PGY-3  [phone]     Teaching Physician Note:  I was present during the visit and the patient was seen and examined by me.   I discussed the case with the resident and agree with the note as documented by the resident with the following exceptions:  None.     Pat Bell M.D.  Internal Medicine   pager [phone]         Electronically signed by Pat Bell MD at 3/1/2018  3:46 PM       ----- Message from Valdemar Pride sent at 2/21/2018  3:02 PM CST -----  Regarding: New Patient Process, DX: Arthritis (unspecified)  Hi Team,     Hope you all are having a beautiful week!     Patient called to initiate New Patient Process for DX: Arthritis (unspecified). Please follow-up.      Kind Regards     Valdemar

## 2018-05-21 ENCOUNTER — MYC MEDICAL ADVICE (OUTPATIENT)
Dept: INTERNAL MEDICINE | Facility: CLINIC | Age: 28
End: 2018-05-21

## 2018-05-21 NOTE — TELEPHONE ENCOUNTER
Referral in chart. Sending to Rheumatology Team for review.   Amy Oglesby [facility]  5/21/2018 11:43 AM

## 2018-05-22 NOTE — TELEPHONE ENCOUNTER
Per Rheumatology Team, okay to schedule with next available attending of fellow. Appointment offered 5/24/2018 with Dr. Calderon, patient accepted.   Amy Oglesby CMA  5/22/2018 12:06 PM

## 2018-05-22 NOTE — TELEPHONE ENCOUNTER
[facility] Call Center    Phone Message    May a detailed message be left on voicemail: yes    Reason for Call: Other: [facility] is waiting for the referral to be faxed, Attn [place] [phone]     Action Taken: Message routed to:  [facility]): [facility]

## 2018-05-23 DIAGNOSIS — M13.0 POLYARTHRITIS: ICD-10-CM

## 2018-05-23 RX ORDER — DICLOFENAC SODIUM 25 MG/1
75 TABLET, DELAYED RELEASE ORAL 2 TIMES DAILY PRN
Qty: 60 TABLET | Refills: 1 | Status: CANCELLED | OUTPATIENT
Start: 2018-05-23

## 2018-05-23 NOTE — TELEPHONE ENCOUNTER
"Basim Cormier,   the [facility] center has agreed to see me, pending a referral letter from you. They stated if they get the letter from you ASAP, I might be able to get into an appointment on May 25th. So, please send a referral letter to the [facility] referral management center, fax: [phone]. They asked you also give them my MRN# : [id number]. They also asked you rufino my letter as \"urgent\", and give them my information from my 2 appointments with you this calendar year. Thank you very much.     Lastly, I have ran out of my Diclofenac prescription. If you could please give me 1 more refill, I would appreciate it. That will get me by until I can see the Rheumatologist at [facility]. If you agree to do so, please send my prescription to [facility] located at [address] [place] [address], phone number: [phone].     Thank you very much,   Jermaine \"Raul\" Dave        "

## 2018-05-24 ENCOUNTER — OFFICE VISIT (OUTPATIENT)
Dept: PHARMACY | Facility: CLINIC | Age: 28
End: 2018-05-24
Payer: COMMERCIAL

## 2018-05-24 ENCOUNTER — OFFICE VISIT (OUTPATIENT)
Dept: RHEUMATOLOGY | Facility: CLINIC | Age: 28
End: 2018-05-24
Attending: INTERNAL MEDICINE
Payer: COMMERCIAL

## 2018-05-24 VITALS
HEIGHT: 69 IN | WEIGHT: 190.7 LBS | BODY MASS INDEX: 28.24 KG/M2 | DIASTOLIC BLOOD PRESSURE: 84 MMHG | SYSTOLIC BLOOD PRESSURE: 133 MMHG | TEMPERATURE: 98 F | HEART RATE: 80 BPM

## 2018-05-24 DIAGNOSIS — M47.819 SERONEGATIVE SPONDYLOARTHROPATHY: ICD-10-CM

## 2018-05-24 DIAGNOSIS — M47.819 SERONEGATIVE SPONDYLOARTHROPATHY: Primary | ICD-10-CM

## 2018-05-24 DIAGNOSIS — M13.0 POLYARTHRITIS: ICD-10-CM

## 2018-05-24 DIAGNOSIS — M13.0 POLYARTHRITIS: Primary | ICD-10-CM

## 2018-05-24 LAB
HBV SURFACE AG SERPL QL IA: NONREACTIVE
HCV AB SERPL QL IA: NONREACTIVE

## 2018-05-24 PROCEDURE — 87340 HEPATITIS B SURFACE AG IA: CPT | Performed by: INTERNAL MEDICINE

## 2018-05-24 PROCEDURE — G0463 HOSPITAL OUTPT CLINIC VISIT: HCPCS | Mod: ZF

## 2018-05-24 PROCEDURE — 86480 TB TEST CELL IMMUN MEASURE: CPT | Performed by: INTERNAL MEDICINE

## 2018-05-24 PROCEDURE — 86803 HEPATITIS C AB TEST: CPT | Performed by: INTERNAL MEDICINE

## 2018-05-24 PROCEDURE — 36415 COLL VENOUS BLD VENIPUNCTURE: CPT | Performed by: INTERNAL MEDICINE

## 2018-05-24 PROCEDURE — 99207 ZZC NO CHARGE LOS: CPT | Performed by: PHARMACIST

## 2018-05-24 RX ORDER — SULFASALAZINE 500 MG/1
1000 TABLET ORAL 3 TIMES DAILY PRN
Qty: 90 TABLET | Refills: 1 | Status: SHIPPED | OUTPATIENT
Start: 2018-05-24 | End: 2018-05-24

## 2018-05-24 RX ORDER — DICLOFENAC SODIUM 25 MG/1
75 TABLET, DELAYED RELEASE ORAL 2 TIMES DAILY PRN
Qty: 60 TABLET | Refills: 3 | Status: SHIPPED | OUTPATIENT
Start: 2018-05-24 | End: 2020-01-29

## 2018-05-24 RX ORDER — DICLOFENAC SODIUM 25 MG/1
75 TABLET, DELAYED RELEASE ORAL 2 TIMES DAILY PRN
Qty: 60 TABLET | Refills: 1 | Status: SHIPPED | OUTPATIENT
Start: 2018-05-24 | End: 2018-05-24

## 2018-05-24 RX ORDER — SULFASALAZINE 500 MG/1
1000 TABLET ORAL 3 TIMES DAILY PRN
Qty: 120 TABLET | Refills: 4 | Status: SHIPPED | OUTPATIENT
Start: 2018-05-24 | End: 2020-01-29

## 2018-05-24 ASSESSMENT — PAIN SCALES - GENERAL: PAINLEVEL: EXTREME PAIN (8)

## 2018-05-24 NOTE — MR AVS SNAPSHOT
After Visit Summary   5/24/2018    Jermaine Acosta    MRN: [id number]           Patient Information     Date Of Birth          1990        Visit Information        Provider Department      5/24/2018 9:00 AM Tay Calderon MD [facility]        Today's Diagnoses     Polyarthritis    -  1    Seronegative spondyloarthropathy           Follow-ups after your visit        Follow-up notes from your care team     Return in about 2 months (around 7/24/2018).      Future tests that were ordered for you today     Open Future Orders        Priority Expected Expires Ordered    RHEUMATOLOGY REFERRAL ASAP  5/25/2019 5/25/2018    XR Hand Bilateral 2 Views Routine 5/25/2018 5/25/2019 5/25/2018            Who to contact     If you have questions or need follow up information about today's clinic visit or your schedule please contact [facility] RHEUMATOLOGY directly at [phone].  Normal or non-critical lab and imaging results will be communicated to you by Tumbiehart, letter or phone within 4 business days after the clinic has received the results. If you do not hear from us within 7 days, please contact the clinic through Tumbiehart or phone. If you have a critical or abnormal lab result, we will notify you by phone as soon as possible.  Submit refill requests through 3KeyIt or call your pharmacy and they will forward the refill request to us. Please allow 3 business days for your refill to be completed.          Additional Information About Your Visit        MyChart Information     3KeyIt gives you secure access to your electronic health record. If you see a primary care provider, you can also send messages to your care team and make appointments. If you have questions, please call your primary care clinic.  If you do not have a primary care provider, please call [phone] and they will assist you.        Care EveryWhere ID     This is your Care EveryWhere ID. This could be used by other  "organizations to access your [facility] records  [id number]        Your Vitals Were     Pulse Temperature Height BMI (Body Mass Index)          80 98  F (36.7  C) 1.759 m (5' 9.25\") 27.96 kg/m2         Blood Pressure from Last 3 Encounters:   05/24/18 133/84   04/10/18 134/76   02/27/18 128/79    Weight from Last 3 Encounters:   05/24/18 86.5 kg (190 lb 11.2 oz)   04/10/18 86.9 kg (191 lb 8 oz)   02/27/18 85.3 kg (188 lb)                 Today's Medication Changes          These changes are accurate as of 5/24/18 11:59 PM.  If you have any questions, ask your nurse or doctor.               Start taking these medicines.        Dose/Directions    adalimumab 40 MG/0.8ML pen kit   Commonly known as:  HUMIRA PEN   Used for:  Polyarthritis, Seronegative spondyloarthropathy   Started by:  Tay Calderon MD        Dose:  40 mg   Inject 0.8 mLs (40 mg) Subcutaneous every 14 days Hold for signs of infection, and seek medical attention.   Quantity:  1 kit   Refills:  3       diclofenac 25 MG EC tablet   Commonly known as:  VOLTAREN   Used for:  Polyarthritis, Seronegative spondyloarthropathy   Started by:  Tay Calderon MD        Dose:  75 mg   Take 3 tablets (75 mg) by mouth 2 times daily as needed for moderate pain 1 tab 75 mg DR tablets, twice daily   Quantity:  60 tablet   Refills:  3       sulfaSALAzine 500 MG tablet   Commonly known as:  AZULFIDINE   Used for:  Polyarthritis, Seronegative spondyloarthropathy   Started by:  Tay Calderon MD        Dose:  1000 mg   Take 2 tablets (1,000 mg) by mouth 3 times daily as needed 2 tabs twice daily   Quantity:  120 tablet   Refills:  4            Where to get your medicines      These medications were sent to [facility] [place], [place] - [address] [facility] [address] [place] [address] 1-[facility], [facility] [address]    Hours:  TRANSPLANT PHONE NUMBER [phone] Phone:  [phone]     diclofenac 25 MG EC tablet    sulfaSALAzine " 500 MG tablet         Call your pharmacy to confirm that your medication is ready for pickup. It may take up to 24 hours for them to receive the prescription. If the prescription is not ready within 3 business days, please contact your clinic or your provider.     We will let you know when these medications are ready. If you don't hear back within 3 business days, please contact us.     adalimumab 40 MG/0.8ML pen kit                Primary Care Provider Office Phone # Fax #    Sriram Cormier [phone]       [address]  [facility] [address]        Equal Access to Services     [facility] Center[facility] : Hadii dorene chairez hadasho Soary, waaxda luqadaha, qaybta kaalmada tenzin, willard sloan . So [facility] [phone].    ATENCIÓN: Si habla español, tiene a zacarias disposición servicios gratuitos de asistencia lingüística. [facility] [phone].    We comply with applicable federal civil rights laws and [place] laws. We do not discriminate on the basis of race, color, national origin, age, disability, sex, sexual orientation, or gender identity.            Thank you!     Thank you for choosing [facility] RHEUMATOLOGY  for your care. Our goal is always to provide you with excellent care. Hearing back from our patients is one way we can continue to improve our services. Please take a few minutes to complete the written survey that you may receive in the mail after your visit with us. Thank you!             Your Updated Medication List - Protect others around you: Learn how to safely use, store and throw away your medicines at www.disposemymeds.org.          This list is accurate as of 5/24/18 11:59 PM.  Always use your most recent med list.                   Brand Name Dispense Instructions for use Diagnosis    adalimumab 40 MG/0.8ML pen kit    HUMIRA PEN    1 kit    Inject 0.8 mLs (40 mg) Subcutaneous every 14 days Hold for signs of infection, and seek medical  attention.    Polyarthritis, Seronegative spondyloarthropathy       diclofenac 25 MG EC tablet    VOLTAREN    60 tablet    Take 3 tablets (75 mg) by mouth 2 times daily as needed for moderate pain 1 tab 75 mg DR tablets, twice daily    Polyarthritis, Seronegative spondyloarthropathy       sulfaSALAzine 500 MG tablet    AZULFIDINE    120 tablet    Take 2 tablets (1,000 mg) by mouth 3 times daily as needed 2 tabs twice daily    Polyarthritis, Seronegative spondyloarthropathy

## 2018-05-24 NOTE — LETTER
"2018       RE: Jermaine Acosta  [address] [facility] [address]     Dear Colleague,    Thank you for referring your patient, Jermaine Acosta, to the [facility] RHEUMATOLOGY at [facility]. Please see a copy of my visit note below.    [facility]  Rheumatology Clinic  New Patient Encounter   Tay Calderon MD  2018     Name: Jermaine Acosta  MRN: [id number]  Age: 28 year old  : 1990  Referring provider: Pat Bell     Assessment and Plan:    Seronegative spondyloarthropathy  Key symptoms include chronic migratory joint pain involving tenosynovitis, enthesopathy, and dactylitis. Exam today shows multiple foci of dactylitis, and tenderness in multiple small joints consistent with active synovitis. We discussed laboratory results completed in -2016 including normal electrolytes, creatinine, sed rate, CRP, and CBC. -CCP, -HepC. He did have mild elevation in his AST/ALT in , which was noted to have normalized in 2018.  Other pertinent laboratory results obtained by Dr. Cormier include absent rheumatoid serologies, normal inflammatory markers, and normal uric acid and renal function in 2018. Xrays of hands in 2016 showed no erosions.    I agree with previous providers that the picture is most compatible with seronegative spondyloarthropathy. Disease is active despite treatment with moderate-dose sulfasalazine and diclofenac. We discussed risks, benefits, and side effects of additional medication options, including methotrexate, biologics, and TNF inhibitors. He is not currently interested in methotrexate given a) concern over the drug's status as a \"cancer' drug, and b) his current social alcohol consumption. Instead, we discussed starting humira injections. He will continue with his sulfasalazine and diclofenac regimen, adding humira once it is approved. He will follow up +2 months after starting humira to discuss tolerance and symptoms " management. At that time, we will consider discontinuing sulfasalazine and transitioning to use of diclofenac PRN.  Plan:   - Hepatitis C antibody  - Hepatitis B surface antigen  - M Tuberculosis by Quantiferon Gold     - sulfaSALAzine (AZULFIDINE) 500 MG tablet  Dispense: 90 tablet; Refill: 1  - diclofenac (VOLTAREN) 25 MG EC tablet  Dispense: 60 tablet; Refill: 1  - adalimumab (HUMIRA PEN) 40 MG/0.8ML pen kit  Dispense: 1 kit; Refill: 3    Follow-up: Return in about 2 months (around 7/24/2018).     HPI:   Jermaine Acosta is a 28 year old male with a history of polyarthritis who presents for initial rheumatologic evaluation. The patient describes persistent difficulties with migratory joint pain and swelling for the past 5 years. At the onset at age 23, he describes onset right thumb pain and swelling that was persistent over the course of 3-4 months. He then developed pain and swelling in his feet, which did not resolve after  Podiatry evaluation or with shoe inserts.    He eventually was seen through a few different specialists for arthritic involvement of his symptoms, including Dr. Omer of [place] in 2014, and Dr. Arash Daley at [facility] in 2016.  Review of notes from these providers reveals that the patient reported migratory small joint pain in multiple fingers and toes. Pain mainly affected the small and large joints of his hands, wrists, and feet, but he describes that pain in the affected joints migrates from time to time. No shoulder, neck, back, hip, or knee involvement.  No psoriasis or associated inflammatory bowel disease.  X-rays revealed no erosions; rheumatoid serologies were negative, HLA-B27 was negative, and bone marrow renal and hepatic function was normal.    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 IN 2014, he was placed on daily Diclofenac and Sulfasalazine starting with a low dose and is now taking 2,000 mg/day  of Sulfasalazine and 150 mg/day Diclofenac daily. He has been on this regimen consistently over the past 4 years and has been successful in decreasing his pain/swelling symptoms. He is not using any other OTC anti-inflammatories. He has been told that his symptoms may be consistent with seronegative spondyloarthritis or psoriatic arthritis, though does not carry any diagnoses. He has no history of rashes.     Unfortunately, he ran out of his medication and has not been taking this for the past 5 days. He experienced a flare at his right mid foot/medial ankle and some finger joints that onset after 2 days without his medication. Upon further evaluation, he does mention daily morning stiffness and tightness in his hands and feet lasting for months. This is most noticeable after periods of prolonged immobilization, such as after watching a movie and sleeping. His joints loosen up in minutes after taking his medications and moving around. Activity and use helps his hands feel improved. He does mention that current activity and weight bearing worsens his right foot pain, but states this improves with increased use as well.     Although not currently, he does mention right lateral epicondyle elbow pain and previous left wrist pain that lasted for greater than 6 months before resolve.     He has no other concerns today.    Review of Systems:   Pertinent items are noted in HPI, remainder of complete ROS is negative.      No eye redness, irritation, itching. No vision changes.   No stool changes, abdominal pain, nausea, or bloating.    Active Medications:     Current Outpatient Prescriptions:      adalimumab (HUMIRA PEN) 40 MG/0.8ML pen kit, Inject 0.8 mLs (40 mg) Subcutaneous every 14 days Hold for signs of infection, and seek medical attention., Disp: 1 kit, Rfl: 3     diclofenac (VOLTAREN) 25 MG EC tablet, Take 3 tablets (75 mg) by mouth 2 times daily as needed for moderate pain 1 tab 75 mg DR tablets, twice daily, Disp:  "60 tablet, Rfl: 3     sulfaSALAzine (AZULFIDINE) 500 MG tablet, Take 2 tablets (1,000 mg) by mouth 3 times daily as needed 2 tabs twice daily, Disp: 120 tablet, Rfl: 4     [DISCONTINUED] diclofenac (VOLTAREN) 25 MG EC tablet, Take 3 tablets (75 mg) by mouth 2 times daily as needed for moderate pain 75 mg DR tablets, Disp: 60 tablet, Rfl: 1     [DISCONTINUED] diclofenac (VOLTAREN) 25 MG EC tablet, Take 3 tablets (75 mg) by mouth 2 times daily as needed for moderate pain 75 mg DR tablets (Patient not taking: Reported on 5/24/2018), Disp: 60 tablet, Rfl: 1      Allergies:   Penicillins      Past Medical History:  Seronegative arthritis:  No history of sexually transmitted disease.   No history of gastrointestinal infections.  Hemachromatosis carrier       Past Surgical History:  Right ankle fracture fixation   [place] tooth removal     Family History:   Mother - breast cancer  Father - diagnosed rheumatoid arthritis in back  Paternal grandmother - hand arthritis   Aunt - knee arthritis   Great aunt - lupus   No history of psoriasis, psoriatic arthritis, reactive arthritis, or ankylosing spondylitis  No family history of diabetes mellitus, multiple sclerosis.     Social History:   . 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   Very active lifestyle.   Does not eat organ based meats. No shellfish.   Occasional alcohol consumption a few times/week. Generally drinks 8-10 drinks of vodka/whiskey per week.   No history of tobacco use. No recreational drug use.      Physical Exam:   /84  Pulse 80  Temp 98  F (36.7  C)  Ht 1.759 m (5' 9.25\")  Wt 86.5 kg (190 lb 11.2 oz)  BMI 27.96 kg/m2   Wt Readings from Last 4 Encounters:   05/24/18 86.5 kg (190 lb 11.2 oz)   04/10/18 86.9 kg (191 lb 8 oz)   02/27/18 85.3 kg (188 lb)   Constitutional: Well-developed, appearing stated age; cooperative  Eyes: Normal EOM, JAY, " "vision, conjunctiva, sclera  ENT: Normal external ears, nose, hearing, lips, teeth, gums, throat. No mucous membrane lesions, normal saliva pool  Neck: No mass or thyroid enlargement  Resp: Lungs clear to auscultation, nl to palpation  CV: RRR, no murmurs, rubs or gallops, no edema  GI: No ABD mass or tenderness, no HSM  : Not tested  Lymph: No cervical, supraclavicular, inguinal or epitrochlear nodes  MS: Swelling at the left first IP. Left 4th MTP, left 2nd MCP, and right 2nd PIP are tender with thickening of the synovial capsule. Fusiform swelling of the right index finger of MCP to DIP, no angulation or visible joint-specific swelling. Base of the right fourth MCP is tender to palpation and fist formation. Incomplete PIP and DIP on right index finger. Incomplete PIP and DIP on left fourth finger. Right wrist lacks 20 degrees of extension and 10 degrees of flexion. Left wrist lacks 30 degrees of extension and 10 degrees of flexion. Shoulder range of motion full without pain. Normal full, painless range of motion of the neck. No spinal tenderness. No tenderness at the sacroiliac joints. Dactylitis of the right third toe, not on the left. Non-erythematous swelling at the medial mid foot on right, none on left. Pain elicited with right sided \"toe raiser.\" Tenderness at insertion of posterior tibialis at medial instep. All other TMJ, elbow, wrist, spine, hip, knee, and ankle joints were examined and found normal. No active synovitis or altered joint anatomy. Full joint ROM. Normal  strength. No dactylitis,  tenosynovitis, enthesopathy.  Skin: No nail pitting, ridging, or dysmorphism. No umbilical rash.   Neuro: Normal cranial nerves, strength, sensation, DTRs.   Psych: Normal judgement, orientation, memory, affect.    Laboratory:   We reviewed his previous records and lab/radiographs results as below.     06/2016: Normal electrolytes. Mild elevation in AST/ALT. Sed rate was normal at 6. CRP was normal at 0.9. " Blood counts showed normal WBC, RBC, PLT.   07/2016: Creatinine 1.01, CCP negative. Hep C negative. Notably, he is a carrier of the hemochromatosis gene.   10/2016: radiographs of bilateral hands show no evidence of periarticular erosions or lytic/blastic lesions.   02/2018:  Component      Latest Ref Rng & Units 2/27/2018   WBC      4.0 - 11.0 10e9/L 6.3   RBC Count      4.4 - 5.9 10e12/L 4.63   Hemoglobin      13.3 - 17.7 g/dL 15.2   Hematocrit      40.0 - 53.0 % 45.4   MCV      78 - 100 fl 98   MCH      26.5 - 33.0 pg 32.8   MCHC      31.5 - 36.5 g/dL 33.5   RDW      10.0 - 15.0 % 11.4   Platelet Count      150 - 450 10e9/L 187   Diff Method       Automated Method   % Neutrophils      % 65.3   % Lymphocytes      % 25.2   % Monocytes      % 8.6   % Eosinophils      % 0.0   % Basophils      % 0.6   % Immature Granulocytes      % 0.3   Nucleated RBCs      0 /100 0   Absolute Neutrophil      1.6 - 8.3 10e9/L 4.1   Absolute Lymphocytes      0.8 - 5.3 10e9/L 1.6   Absolute Monocytes      0.0 - 1.3 10e9/L 0.5   Absolute Eosinophils      0.0 - 0.7 10e9/L 0.0   Absolute Basophils      0.0 - 0.2 10e9/L 0.0   Abs Immature Granulocytes      0 - 0.4 10e9/L 0.0   Absolute Nucleated RBC       0.0   Sodium      133 - 144 mmol/L 137   Potassium      3.4 - 5.3 mmol/L 3.5   Chloride      94 - 109 mmol/L 103   Carbon Dioxide      20 - 32 mmol/L 27   Anion Gap      3 - 14 mmol/L 6   Glucose      70 - 99 mg/dL 120 (H)   Urea Nitrogen      7 - 30 mg/dL 20   Creatinine      0.66 - 1.25 mg/dL 0.90   GFR Estimate      >60 mL/min/1.7m2 >90   GFR Estimate If Black      >60 mL/min/1.7m2 >90   Calcium      8.5 - 10.1 mg/dL 9.2   Bilirubin Total      0.2 - 1.3 mg/dL 0.9   Albumin      3.4 - 5.0 g/dL 4.3   Protein Total      6.8 - 8.8 g/dL 7.8   Alkaline Phosphatase      40 - 150 U/L 68   ALT      0 - 70 U/L 46   AST      0 - 45 U/L 28   CK Total      30 - 300 U/L 214   Rheumatoid Factor      <20 IU/mL <20   Cyclic Citrullinated Peptide  Antibody, IgG      <7 U/mL 1   Sed Rate      0 - 15 mm/h 7   CRP Inflammation      0.0 - 8.0 mg/L 6.0   Uric Acid      3.5 - 7.2 mg/dL 4.7          Scribe Disclosure:   I, Myrna Shailesh, am serving as a scribe to document services personally performed by Tay Calderon MD at this visit, based upon the provider's statements to me. All documentation has been reviewed by the aforementioned provider prior to being entered into the official medical record.     Portions of this medical record were completed by a scribe. UPON MY REVIEW AND AUTHENTICATION BY ELECTRONIC SIGNATURE, this confirms (a) I performed the applicable clinical services, and (b) the record is accurate.    Tay Calderon MD  Staff RheumatologistBISMARK

## 2018-05-24 NOTE — MR AVS SNAPSHOT
After Visit Summary   5/24/2018    Jermaine Acosta    MRN: [id number]           Patient Information     Date Of Birth          1990        Visit Information        Provider Department      5/24/2018 10:00 AM Litzy Ramirez [facility] Rheumatology [place]        Today's Diagnoses     Seronegative spondyloarthropathy    -  1       Follow-ups after your visit        Who to contact     If you have questions or need follow up information about today's clinic visit or your schedule please contact [facility] RHEUMATOLOGY [facility] directly at [phone].  Normal or non-critical lab and imaging results will be communicated to you by [email or web address], letter or phone within 4 business days after the clinic has received the results. If you do not hear from us within 7 days, please contact the clinic through [employer] or phone. If you have a critical or abnormal lab result, we will notify you by phone as soon as possible.  Submit refill requests through [employer] or call your pharmacy and they will forward the refill request to us. Please allow 3 business days for your refill to be completed.          Additional Information About Your Visit        MyChart Information     [employer] gives you secure access to your electronic health record. If you see a primary care provider, you can also send messages to your care team and make appointments. If you have questions, please call your primary care clinic.  If you do not have a primary care provider, please call [phone] and they will assist you.        Care EveryWhere ID     This is your Care EveryWhere ID. This could be used by other organizations to access your [facility] medical records  [id number]         Blood Pressure from Last 3 Encounters:   05/24/18 133/84   04/10/18 134/76   02/27/18 128/79    Weight from Last 3 Encounters:   05/24/18 190 lb 11.2 oz (86.5 kg)   04/10/18 191 lb 8 oz (86.9 kg)   02/27/18 188 lb (85.3 kg)              Today,  you had the following     No orders found for display         Today's Medication Changes          These changes are accurate as of 5/24/18 11:22 AM.  If you have any questions, ask your nurse or doctor.               Start taking these medicines.        Dose/Directions    adalimumab 40 MG/0.8ML pen kit   Commonly known as:  HUMIRA PEN   Used for:  Polyarthritis, Seronegative spondyloarthropathy   Started by:  Tay Calderon MD        Dose:  40 mg   Inject 0.8 mLs (40 mg) Subcutaneous every 14 days Hold for signs of infection, and seek medical attention.   Quantity:  1 kit   Refills:  3       diclofenac 25 MG EC tablet   Commonly known as:  VOLTAREN   Used for:  Polyarthritis, Seronegative spondyloarthropathy   Started by:  Tay Calderon MD        Dose:  75 mg   Take 3 tablets (75 mg) by mouth 2 times daily as needed for moderate pain 1 tab 75 mg DR tablets, twice daily   Quantity:  60 tablet   Refills:  3       sulfaSALAzine 500 MG tablet   Commonly known as:  AZULFIDINE   Used for:  Polyarthritis, Seronegative spondyloarthropathy   Started by:  Tay Calderon MD        Dose:  1000 mg   Take 2 tablets (1,000 mg) by mouth 3 times daily as needed 2 tabs twice daily   Quantity:  120 tablet   Refills:  4            Where to get your medicines      These medications were sent to [address] [address]    Hours:  TRANSPLANT PHONE NUMBER [phone] Phone:  [phone]     diclofenac 25 MG EC tablet    sulfaSALAzine 500 MG tablet         Call your pharmacy to confirm that your medication is ready for pickup. It may take up to 24 hours for them to receive the prescription. If the prescription is not ready within 3 business days, please contact your clinic or your provider.     We will let you know when these medications are ready. If you don't hear back within 3 business days, please contact us.      adalimumab 40 MG/0.8ML pen kit                Primary Care Provider Office Phone # Fax #    Sriram Cormier [phone]       [address] [address]        Equal Access to Services     RIVKA LOCKWOOD : Hadii dorene ku hadleonao Soomaali, waaxda luqadaha, qaybta kaalmada adeegyada, waxay idiin hayaan adeeg khrachelsh lasherri keita. So [facility] [phone].    ATENCIÓN: Si habla español, tiene a zacarias disposición servicios gratuitos de asistencia lingüística. Llame al [phone].    We comply with applicable federal civil rights laws and [place] laws. We do not discriminate on the basis of race, color, national origin, age, disability, sex, sexual orientation, or gender identity.            Thank you!     Thank you for choosing [facility] RHEUMATOLOGY [facility]  for your care. Our goal is always to provide you with excellent care. Hearing back from our patients is one way we can continue to improve our services. Please take a few minutes to complete the written survey that you may receive in the mail after your visit with us. Thank you!             Your Updated Medication List - Protect others around you: Learn how to safely use, store and throw away your medicines at www.disposemymeds.org.          This list is accurate as of 5/24/18 11:22 AM.  Always use your most recent med list.                   Brand Name Dispense Instructions for use Diagnosis    adalimumab 40 MG/0.8ML pen kit    HUMIRA PEN    1 kit    Inject 0.8 mLs (40 mg) Subcutaneous every 14 days Hold for signs of infection, and seek medical attention.    Polyarthritis, Seronegative spondyloarthropathy       diclofenac 25 MG EC tablet    VOLTAREN    60 tablet    Take 3 tablets (75 mg) by mouth 2 times daily as needed for moderate pain 1 tab 75 mg DR tablets, twice daily    Polyarthritis, Seronegative spondyloarthropathy       sulfaSALAzine 500 MG tablet    AZULFIDINE    120 tablet    Take 2 tablets (1,000 mg) by  mouth 3 times daily as needed 2 tabs twice daily    Polyarthritis, Seronegative spondyloarthropathy

## 2018-05-24 NOTE — PROGRESS NOTES
"[facility]  Rheumatology Clinic  New Patient Encounter   Tay Calderon MD  2018     Name: Jermaine Acosta  MRN: [id number]  Age: 28 year old  : 1990  Referring provider: Pat Bell     Assessment and Plan:    Seronegative spondyloarthropathy  Key symptoms include chronic migratory joint pain involving tenosynovitis, enthesopathy, and dactylitis. Exam today shows multiple foci of dactylitis, and tenderness in multiple small joints consistent with active synovitis. We discussed laboratory results completed in -2016 including normal electrolytes, creatinine, sed rate, CRP, and CBC. -CCP, -HepC. He did have mild elevation in his AST/ALT in , which was noted to have normalized in 2018.  Other pertinent laboratory results obtained by Dr. Cormier include absent rheumatoid serologies, normal inflammatory markers, and normal uric acid and renal function in 2018. Xrays of hands in 2016 showed no erosions.    I agree with previous providers that the picture is most compatible with seronegative spondyloarthropathy. Disease is active despite treatment with moderate-dose sulfasalazine and diclofenac. We discussed risks, benefits, and side effects of additional medication options, including methotrexate, biologics, and TNF inhibitors. He is not currently interested in methotrexate given a) concern over the drug's status as a \"cancer' drug, and b) his current social alcohol consumption. Instead, we discussed starting humira injections. He will continue with his sulfasalazine and diclofenac regimen, adding humira once it is approved. He will follow up +2 months after starting humira to discuss tolerance and symptoms management. At that time, we will consider discontinuing sulfasalazine and transitioning to use of diclofenac PRN.  Plan:   - Hepatitis C antibody  - Hepatitis B surface antigen  - M Tuberculosis by [employer]     - sulfaSALAzine (AZULFIDINE) 500 MG tablet  Dispense: 90 tablet; " Refill: 1  - diclofenac (VOLTAREN) 25 MG EC tablet  Dispense: 60 tablet; Refill: 1  - adalimumab (HUMIRA PEN) 40 MG/0.8ML pen kit  Dispense: 1 kit; Refill: 3    Follow-up: Return in about 2 months (around 7/24/2018).     HPI:   Jermaine Acosta is a 28 year old male with a history of polyarthritis who presents for initial rheumatologic evaluation. The patient describes persistent difficulties with migratory joint pain and swelling for the past 5 years. At the onset at age 23, he describes onset right thumb pain and swelling that was persistent over the course of 3-4 months. He then developed pain and swelling in his feet, which did not resolve after  Podiatry evaluation or with shoe inserts.    He eventually was seen through a few different specialists for arthritic involvement of his symptoms, including Dr. Omer of [place] in 2014, and Dr. Arash Daley at [facility] in 2016.  Review of notes from these providers reveals that the patient reported migratory small joint pain in multiple fingers and toes. Pain mainly affected the small and large joints of his hands, wrists, and feet, but he describes that pain in the affected joints migrates from time to time. No shoulder, neck, back, hip, or knee involvement.  No psoriasis or associated inflammatory bowel disease.  X-rays revealed no erosions; rheumatoid serologies were negative, HLA-B27 was negative, and bone marrow renal and hepatic function was normal.    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 IN 2014, he was placed on daily Diclofenac and Sulfasalazine starting with a low dose and is now taking 2,000 mg/day of Sulfasalazine and 150 mg/day Diclofenac daily. He has been on this regimen consistently over the past 4 years and has been successful in decreasing his pain/swelling symptoms. He is not using any other OTC anti-inflammatories. He has been told that his symptoms may be consistent  with seronegative spondyloarthritis or psoriatic arthritis, though does not carry any diagnoses. He has no history of rashes.     Unfortunately, he ran out of his medication and has not been taking this for the past 5 days. He experienced a flare at his right mid foot/medial ankle and some finger joints that onset after 2 days without his medication. Upon further evaluation, he does mention daily morning stiffness and tightness in his hands and feet lasting for months. This is most noticeable after periods of prolonged immobilization, such as after watching a movie and sleeping. His joints loosen up in minutes after taking his medications and moving around. Activity and use helps his hands feel improved. He does mention that current activity and weight bearing worsens his right foot pain, but states this improves with increased use as well.     Although not currently, he does mention right lateral epicondyle elbow pain and previous left wrist pain that lasted for greater than 6 months before resolve.     He has no other concerns today.    Review of Systems:   Pertinent items are noted in HPI, remainder of complete ROS is negative.      No eye redness, irritation, itching. No vision changes.   No stool changes, abdominal pain, nausea, or bloating.    Active Medications:     Current Outpatient Prescriptions:      adalimumab (HUMIRA PEN) 40 MG/0.8ML pen kit, Inject 0.8 mLs (40 mg) Subcutaneous every 14 days Hold for signs of infection, and seek medical attention., Disp: 1 kit, Rfl: 3     diclofenac (VOLTAREN) 25 MG EC tablet, Take 3 tablets (75 mg) by mouth 2 times daily as needed for moderate pain 1 tab 75 mg DR tablets, twice daily, Disp: 60 tablet, Rfl: 3     sulfaSALAzine (AZULFIDINE) 500 MG tablet, Take 2 tablets (1,000 mg) by mouth 3 times daily as needed 2 tabs twice daily, Disp: 120 tablet, Rfl: 4     [DISCONTINUED] diclofenac (VOLTAREN) 25 MG EC tablet, Take 3 tablets (75 mg) by mouth 2 times daily as needed  "for moderate pain 75 mg DR tablets, Disp: 60 tablet, Rfl: 1     [DISCONTINUED] diclofenac (VOLTAREN) 25 MG EC tablet, Take 3 tablets (75 mg) by mouth 2 times daily as needed for moderate pain 75 mg DR tablets (Patient not taking: Reported on 5/24/2018), Disp: 60 tablet, Rfl: 1      Allergies:   Penicillins      Past Medical History:  Seronegative arthritis:  No history of sexually transmitted disease.   No history of gastrointestinal infections.  Hemachromatosis carrier       Past Surgical History:  Right ankle fracture fixation   [place] tooth removal     Family History:   Mother - breast cancer  Father - diagnosed rheumatoid arthritis in back  Paternal grandmother - hand arthritis   Aunt - knee arthritis   Great aunt - lupus   No history of psoriasis, psoriatic arthritis, reactive arthritis, or ankylosing spondylitis  No family history of diabetes mellitus, multiple sclerosis.     Social History:   . 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   Very active lifestyle.   Does not eat organ based meats. No shellfish.   Occasional alcohol consumption a few times/week. Generally drinks 8-10 drinks of vodka/whiskey per week.   No history of tobacco use. No recreational drug use.      Physical Exam:   /84  Pulse 80  Temp 98  F (36.7  C)  Ht 1.759 m (5' 9.25\")  Wt 86.5 kg (190 lb 11.2 oz)  BMI 27.96 kg/m2   Wt Readings from Last 4 Encounters:   05/24/18 86.5 kg (190 lb 11.2 oz)   04/10/18 86.9 kg (191 lb 8 oz)   02/27/18 85.3 kg (188 lb)   Constitutional: Well-developed, appearing stated age; cooperative  Eyes: Normal EOM, PERRLA, vision, conjunctiva, sclera  ENT: Normal external ears, nose, hearing, lips, teeth, gums, throat. No mucous membrane lesions, normal saliva pool  Neck: No mass or thyroid enlargement  Resp: Lungs clear to auscultation, nl to palpation  CV: RRR, no murmurs, rubs or gallops, no " "edema  GI: No ABD mass or tenderness, no HSM  : Not tested  Lymph: No cervical, supraclavicular, inguinal or epitrochlear nodes  MS: Swelling at the left first IP. Left 4th MTP, left 2nd MCP, and right 2nd PIP are tender with thickening of the synovial capsule. Fusiform swelling of the right index finger of MCP to DIP, no angulation or visible joint-specific swelling. Base of the right fourth MCP is tender to palpation and fist formation. Incomplete PIP and DIP on right index finger. Incomplete PIP and DIP on left fourth finger. Right wrist lacks 20 degrees of extension and 10 degrees of flexion. Left wrist lacks 30 degrees of extension and 10 degrees of flexion. Shoulder range of motion full without pain. Normal full, painless range of motion of the neck. No spinal tenderness. No tenderness at the sacroiliac joints. Dactylitis of the right third toe, not on the left. Non-erythematous swelling at the medial mid foot on right, none on left. Pain elicited with right sided \"toe raiser.\" Tenderness at insertion of posterior tibialis at medial instep. All other TMJ, elbow, wrist, spine, hip, knee, and ankle joints were examined and found normal. No active synovitis or altered joint anatomy. Full joint ROM. Normal  strength. No dactylitis,  tenosynovitis, enthesopathy.  Skin: No nail pitting, ridging, or dysmorphism. No umbilical rash.   Neuro: Normal cranial nerves, strength, sensation, DTRs.   Psych: Normal judgement, orientation, memory, affect.    Laboratory:   We reviewed his previous records and lab/radiographs results as below.     06/2016: Normal electrolytes. Mild elevation in AST/ALT. Sed rate was normal at 6. CRP was normal at 0.9. Blood counts showed normal WBC, RBC, PLT.   07/2016: Creatinine 1.01, CCP negative. Hep C negative. Notably, he is a carrier of the hemochromatosis gene.   10/2016: radiographs of bilateral hands show no evidence of periarticular erosions or lytic/blastic lesions. "   02/2018:  Component      Latest Ref Rng & Units 2/27/2018   WBC      4.0 - 11.0 10e9/L 6.3   RBC Count      4.4 - 5.9 10e12/L 4.63   Hemoglobin      13.3 - 17.7 g/dL 15.2   Hematocrit      40.0 - 53.0 % 45.4   MCV      78 - 100 fl 98   MCH      26.5 - 33.0 pg 32.8   MCHC      31.5 - 36.5 g/dL 33.5   RDW      10.0 - 15.0 % 11.4   Platelet Count      150 - 450 10e9/L 187   Diff Method       Automated Method   % Neutrophils      % 65.3   % Lymphocytes      % 25.2   % Monocytes      % 8.6   % Eosinophils      % 0.0   % Basophils      % 0.6   % Immature Granulocytes      % 0.3   Nucleated RBCs      0 /100 0   Absolute Neutrophil      1.6 - 8.3 10e9/L 4.1   Absolute Lymphocytes      0.8 - 5.3 10e9/L 1.6   Absolute Monocytes      0.0 - 1.3 10e9/L 0.5   Absolute Eosinophils      0.0 - 0.7 10e9/L 0.0   Absolute Basophils      0.0 - 0.2 10e9/L 0.0   Abs Immature Granulocytes      0 - 0.4 10e9/L 0.0   Absolute Nucleated RBC       0.0   Sodium      133 - 144 mmol/L 137   Potassium      3.4 - 5.3 mmol/L 3.5   Chloride      94 - 109 mmol/L 103   Carbon Dioxide      20 - 32 mmol/L 27   Anion Gap      3 - 14 mmol/L 6   Glucose      70 - 99 mg/dL 120 (H)   Urea Nitrogen      7 - 30 mg/dL 20   Creatinine      0.66 - 1.25 mg/dL 0.90   GFR Estimate      >60 mL/min/1.7m2 >90   GFR Estimate If Black      >60 mL/min/1.7m2 >90   Calcium      8.5 - 10.1 mg/dL 9.2   Bilirubin Total      0.2 - 1.3 mg/dL 0.9   Albumin      3.4 - 5.0 g/dL 4.3   Protein Total      6.8 - 8.8 g/dL 7.8   Alkaline Phosphatase      40 - 150 U/L 68   ALT      0 - 70 U/L 46   AST      0 - 45 U/L 28   CK Total      30 - 300 U/L 214   Rheumatoid Factor      <20 IU/mL <20   Cyclic Citrullinated Peptide Antibody, IgG      <7 U/mL 1   Sed Rate      0 - 15 mm/h 7   CRP Inflammation      0.0 - 8.0 mg/L 6.0   Uric Acid      3.5 - 7.2 mg/dL 4.7          Scribe Disclosure:   Myrna NICHOLSON, am serving as a scribe to document services personally performed by Tay DELONG  MD Kvng at this visit, based upon the provider's statements to me. All documentation has been reviewed by the aforementioned provider prior to being entered into the official medical record.     Portions of this medical record were completed by a scribe. UPON MY REVIEW AND AUTHENTICATION BY ELECTRONIC SIGNATURE, this confirms (a) I performed the applicable clinical services, and (b) the record is accurate.    Tay Calderon MD  Staff Rheumatologist, [facility]

## 2018-05-24 NOTE — PROGRESS NOTES
Clinical Consult:                                                    Jermaine Acosta is a 28 year old male coming in for a clinical pharmacist consult.  He was referred to me from Dr. Tay Calderon.     Reason for Consult: New to biologic therapy - requested to review pharmacy process. Has tried diclofenac and sulfasalazine combination.     Discussion: Provided overview of specialty pharmacy process including prior authorization course and benefits investigation. Pt is agreeable to having medications sent to him, and is aware that he will need to be in contact with pharmacy to set this up once approved.     He is somewhat hesitant about the injection process and would welcome additional information. He is also agreeable to coming back for formal teaching/instruction once medication is in hand. We briefly discussed Humira today, including increased risk of infection. He does share that although he has not tried MTX in the past, he does not wish to conform to the lifestyle required for methotrexate.    He is doing pre-biologic blood work today.    Plan:  1. Pt to follow-up with pharmacy re: delivery when approved  2. Pt to schedule nurse/pharmacy visit for Humira injection training

## 2018-05-25 DIAGNOSIS — M13.0 POLYARTHRITIS: Primary | ICD-10-CM

## 2018-05-25 LAB
M TB TUBERC IFN-G BLD QL: NEGATIVE
M TB TUBERC IFN-G/MITOGEN IGNF BLD: 0.01 IU/ML

## 2018-05-25 NOTE — TELEPHONE ENCOUNTER
I am confused.  He was just seen yesterday in our Rheumatology Clinic, and received refills of his meds by them.   I cancelled the orders I placed today.  SB

## 2018-05-29 ENCOUNTER — TELEPHONE (OUTPATIENT)
Dept: RHEUMATOLOGY | Facility: CLINIC | Age: 28
End: 2018-05-29

## 2018-05-29 NOTE — TELEPHONE ENCOUNTER
PA Initiation    Medication: HUMIRA   Insurance Company: [facility] - Phone [phone] Fax [phone]  Pharmacy Filling the Rx: [facility] - [place], [place] - [facility] [address]  Filling Pharmacy Phone:    Filling Pharmacy Fax:    Start Date: 5/29/2018

## 2018-05-30 NOTE — TELEPHONE ENCOUNTER
Prior Authorization Approval    Authorization Effective Date: 5/24/2018  Authorization Expiration Date: 5/24/2019  Medication: HUMIRA - Approved  Approved Dose/Quantity: 2 for 28 days  Reference #: [id number]   Insurance Company: [employer] [place] - Phone [phone] Fax [phone]  Expected CoPay: $35.00     CoPay Card Available: Yes   Foundation Assistance Needed:    Which Pharmacy is filling the prescription (Not needed for infusion/clinic administered): [facility] - [place], [place] - [facility] [address]  Pharmacy Notified: Yes - rx released  Patient Notified: Yes - via pharmacy

## 2018-08-01 NOTE — PROGRESS NOTES
[facility]  Rheumatology Clinic  Tay Calderon MD  2018     Name: Jermaine Acosta  MRN: [id number]  Age: 28 year old  : 1990  Referring provider: Pat Bell     Assessment and Plan:    Seronegative spondyloarthropathy  There is dramatic reduction in joint pain and dactylitis noted in May; improvement is tightly correlated with start of humira. Exam is benign today. Screens for hepatitis and TB performed prior to the onset of humira in 2018 were negative.     I  that symptoms of seronegative spondyloarthropathy are >90% suppressed with adalimumab monotherapy. The patient has already discontinued sulfasalazine and diclofenac due to his feeling so well; I agree with continuing off of these medications. He may use diclofenac 50mg twice a day as needed. There is no regular laboratory monitoring required for persons receiving humira monotherapy. We had a discussion about the established risks of long term humira, including risks of lymphoproliferative disorder and opportunistic infection. I also described the possibility of long term remission induced by a course of humira. I recommend continuous treatment with humira 40mg every other week for a period of at least 9 months. If at the end of that time, the patient's dramatic improvement has continued unabated, we can consider reducing the frequency of humira injections.     Plan:  - adalimumab (HUMIRA PEN) 40 MG/0.8ML pen kit  Dispense: 1 kit; Refill: 6     Follow-up: Return in about 6 months (around 2019).     HPI:   Jermaine Acosta has a history of seronegative spondyloarthropathy who presents for follow up. He was last seen on 18, at which time he recently ran out of his medication and experienced a flare in his right mid foot/medial ankle and some finger joints that onset 2 days after stopping his medication. Plan at that time was to add humira once it was approved.    Today, the patient reports that 48 hours after his first dose of  humira (first dose was 6-7 days after last visit), he had dramatic improvement. He feels amazing. All of his symptoms have been completely resolved and he cannot think of any residual issues. His bowels are normal, he denies any significant weight change, and he has no skin issues. He states that he has not changed his lifestyle at all, and that he has always been very physically active regardless of his pain. However, now that he is on humira, he finds that everything he does from going to the bathroom to lifting weights is painless and much easier than before. In fact, he believes that his physical performance has improved since starting humira, and believes that he has made significant progress in the gym after stopping antiinflammatory medications. He has recently had some sleep troubles attributed to family visiting. He notes that having a set daily routine is needed for him to sleep well and that having a relatively free summer has likely affected his sleep.     The only reaction he has to humira injections is slight tenderness that lasts around 1-2 hours after the injection and a small red erythema. He denies any itchiness or pain. His latest (5th) injection was yesterday. He reports feeling the same on day 1 after an injection all the way through day 13 before his next injection. He stopped taking sulfasalazine and diclofenac around 5 weeks ago.     Review of Systems:   Pertinent items are noted in HPI, remainder of complete ROS is negative.    No recent problems with hearing or vision. No swallowing problems.   No breathing difficulty, shortness of breath, coughing, or wheezing  No chest pain or palpitations  No heart burn, indigestion, abdominal pain, nausea, vomiting, diarrhea  No urination problems, no bloody, cloudy urine, no dysuria  No numbing, tingling, weakness  No headaches or confusion  No rashes. No easy bleeding or bruising.    Active Medications:     Current Outpatient Prescriptions:       adalimumab (HUMIRA PEN) 40 MG/0.8ML pen kit, Inject 0.8 mLs (40 mg) Subcutaneous every 14 days Hold for signs of infection, and seek medical attention., Disp: 1 kit, Rfl: 6     diclofenac (VOLTAREN) 25 MG EC tablet, Take 3 tablets (75 mg) by mouth 2 times daily as needed for moderate pain 1 tab 75 mg DR tablets, twice daily (Patient not taking: Reported on 8/2/2018), Disp: 60 tablet, Rfl: 3     sulfaSALAzine (AZULFIDINE) 500 MG tablet, Take 2 tablets (1,000 mg) by mouth 3 times daily as needed 2 tabs twice daily (Patient not taking: Reported on 8/2/2018), Disp: 120 tablet, Rfl: 4     [DISCONTINUED] adalimumab (HUMIRA PEN) 40 MG/0.8ML pen kit, Inject 0.8 mLs (40 mg) Subcutaneous every 14 days Hold for signs of infection, and seek medical attention., Disp: 1 kit, Rfl: 3      Allergies:   Penicillins      Past Medical History:  Seronegative arthritis - treated with sulfasalazine and oral diclofenac, see note of May 2018     Past Surgical History:  Right ankle fracture  [place] teeth removal    Family History:   Father - rheumatoid arthritis      Social History:   No smoking or tobacco use  Moderate alcohol use, 3x/week  Works as  at [employer]     Physical Exam:   /80 (BP Location: Right arm)  Pulse 82  Temp 98.3  F (36.8  C) (Oral)  Wt 87.9 kg (193 lb 11.2 oz)  SpO2 99%  BMI 28.4 kg/m2   Wt Readings from Last 4 Encounters:   08/02/18 87.9 kg (193 lb 11.2 oz)   05/24/18 86.5 kg (190 lb 11.2 oz)   04/10/18 86.9 kg (191 lb 8 oz)   02/27/18 85.3 kg (188 lb)   Constitutional: Well-developed, appearing stated age; cooperative  Eyes: Normal EOM, PERRLA, vision, conjunctiva, sclera  ENT: Normal external ears, nose, hearing, lips, teeth, gums, throat. No mucous membrane lesions, normal saliva pool  Neck: No mass or thyroid enlargement  Resp: Lungs clear to auscultation, nl to palpation  CV: RRR, no murmurs, rubs or gallops, no edema  GI: No ABD mass or tenderness, no HSM  Lymph: No cervical,  supraclavicular, inguinal or epitrochlear nodes  MS: Lacks 20 degrees of extension on left wrist and 15 degrees on right wrist, close to full flexion in both wrists. The TMJ, neck, shoulder, elbow, MCP/PIP/DIP, spine, hip, knee, ankle, and foot MTP/IP joints were examined and found normal. No active synovitis or altered joint anatomy. Full joint ROM. Normal  strength. No dactylitis,  tenosynovitis, enthesopathy.  Skin: 0.5 cm poorly marginated erythema on thigh around site of needle puncture. No nail pitting, alopecia, rash, nodules or lesions  Neuro: Normal cranial nerves, strength, sensation, DTRs.   Psych: Normal judgement, orientation, memory, affect.    Laboratory:   Component      Latest Ref Rng & Units 5/24/2018   M Tuberculosis Result      NEG:Negative Negative   M Tuberculosis Antigen Value      IU/mL 0.01   Hepatitis C Antibody      NR:Nonreactive Nonreactive   Hep B Surface Agn      NR:Nonreactive Nonreactive     Scribe Disclosure:   I, Raul Forde, am serving as a scribe to document services personally performed by Tay Calderon MD at this visit, based upon the provider's statements to me. All documentation has been reviewed by the aforementioned provider prior to being entered into the official medical record.     Portions of this medical record were completed by a scribe. UPON MY REVIEW AND AUTHENTICATION BY ELECTRONIC SIGNATURE, this confirms (a) I performed the applicable clinical services, and (b) the record is accurate.  Tay Calderon MD  Staff Rheumatologist, [facility]

## 2018-08-02 ENCOUNTER — OFFICE VISIT (OUTPATIENT)
Dept: RHEUMATOLOGY | Facility: CLINIC | Age: 28
End: 2018-08-02
Attending: INTERNAL MEDICINE
Payer: COMMERCIAL

## 2018-08-02 VITALS
SYSTOLIC BLOOD PRESSURE: 143 MMHG | WEIGHT: 193.7 LBS | BODY MASS INDEX: 28.4 KG/M2 | DIASTOLIC BLOOD PRESSURE: 80 MMHG | HEART RATE: 82 BPM | TEMPERATURE: 98.3 F | OXYGEN SATURATION: 99 %

## 2018-08-02 DIAGNOSIS — M47.819 SERONEGATIVE SPONDYLOARTHROPATHY: ICD-10-CM

## 2018-08-02 DIAGNOSIS — M13.0 POLYARTHRITIS: ICD-10-CM

## 2018-08-02 PROCEDURE — G0463 HOSPITAL OUTPT CLINIC VISIT: HCPCS | Mod: ZF

## 2018-08-02 ASSESSMENT — PAIN SCALES - GENERAL: PAINLEVEL: NO PAIN (0)

## 2018-08-02 NOTE — MR AVS SNAPSHOT
After Visit Summary   8/2/2018    Jermaine Acosta    MRN: [id number]           Patient Information     Date Of Birth          1990        Visit Information        Provider Department      8/2/2018 10:00 AM Tay Calderon MD [facility] Rheumatology        Today's Diagnoses     Polyarthritis        Seronegative spondyloarthropathy           Follow-ups after your visit        Follow-up notes from your care team     Return in about 6 months (around 2/2/2019).      Your next 10 appointments already scheduled     Feb 01, 2019 10:00 AM CST   (Arrive by 9:45 AM)   Return Visit with Tay Calderon MD   [facility] Rheumatology ([facility])    [address]  [address]  [place] [address] [phone]              Who to contact     If you have questions or need follow up information about today's clinic visit or your schedule please contact [facility] RHEUMATOLOGY directly at [phone].  Normal or non-critical lab and imaging results will be communicated to you by MyChart, letter or phone within 4 business days after the clinic has received the results. If you do not hear from us within 7 days, please contact the clinic through [employer]hart or phone. If you have a critical or abnormal lab result, we will notify you by phone as soon as possible.  Submit refill requests through [employer]Manage or call your pharmacy and they will forward the refill request to us. Please allow 3 business days for your refill to be completed.          Additional Information About Your Visit        MyChart Information     [employer]Manage gives you secure access to your electronic health record. If you see a primary care provider, you can also send messages to your care team and make appointments. If you have questions, please call your primary care clinic.  If you do not have a primary care provider, please call [phone] and they will assist you.        Care EveryWhere ID     This is your Care  EveryWhere ID. This could be used by other organizations to access your [facility] medical records  [id number]        Your Vitals Were     Pulse Temperature Pulse Oximetry BMI (Body Mass Index)          82 98.3  F (36.8  C) (Oral) 99% 28.4 kg/m2         Blood Pressure from Last 3 Encounters:   08/02/18 143/80   05/24/18 133/84   04/10/18 134/76    Weight from Last 3 Encounters:   08/02/18 87.9 kg (193 lb 11.2 oz)   05/24/18 86.5 kg (190 lb 11.2 oz)   04/10/18 86.9 kg (191 lb 8 oz)              Today, you had the following     No orders found for display         Where to get your medicines      These medications were sent to [facility] - [place], [place] - [address]   [email or web address] [facility] [address]    Hours:  Mon-Fri 8:30am-5:00pm Toll Free [phone] Phone:  [phone]     adalimumab 40 MG/0.8ML pen kit          Primary Care Provider Fax #    Physician No Ref-Primary [phone]       No address on file        Equal Access to Services     AARON LOCKWOOD : Hadii dorene fineo Soary, waaxda luqadaha, qaybta kaalmada adeceasaryada, willard keita. So [facility] [phone].    ATENCIÓN: Si habla español, tiene a zacarias disposición servicios gratuitos de asistencia lingüística. Abhijit al [phone].    We comply with applicable federal civil rights laws and [place] laws. We do not discriminate on the basis of race, color, national origin, age, disability, sex, sexual orientation, or gender identity.            Thank you!     Thank you for choosing [facility] RHEUMATOLOGY  for your care. Our goal is always to provide you with excellent care. Hearing back from our patients is one way we can continue to improve our services. Please take a few minutes to complete the written survey that you may receive in the mail after your visit with us. Thank you!             Your Updated Medication List - Protect others around you: Learn how to safely use, store and  throw away your medicines at www.disposemymeds.org.          This list is accurate as of 8/2/18 10:39 AM.  Always use your most recent med list.                   Brand Name Dispense Instructions for use Diagnosis    adalimumab 40 MG/0.8ML pen kit    HUMIRA PEN    1 kit    Inject 0.8 mLs (40 mg) Subcutaneous every 14 days Hold for signs of infection, and seek medical attention.    Polyarthritis, Seronegative spondyloarthropathy       diclofenac 25 MG EC tablet    VOLTAREN    60 tablet    Take 3 tablets (75 mg) by mouth 2 times daily as needed for moderate pain 1 tab 75 mg DR tablets, twice daily    Polyarthritis, Seronegative spondyloarthropathy       sulfaSALAzine 500 MG tablet    AZULFIDINE    120 tablet    Take 2 tablets (1,000 mg) by mouth 3 times daily as needed 2 tabs twice daily    Polyarthritis, Seronegative spondyloarthropathy

## 2018-08-02 NOTE — NURSING NOTE
Chief Complaint   Patient presents with     RECHECK     follow up arthritis     /80 (BP Location: Right arm)  Pulse 82  Temp 98.3  F (36.8  C) (Oral)  Wt 87.9 kg (193 lb 11.2 oz)  SpO2 99%  BMI 28.4 kg/m2   Gail Yeager

## 2018-08-02 NOTE — LETTER
2018      RE: Jermaine Acosta  [address] [address]       [facility]  Rheumatology Clinic  Tay Calderon MD  2018     Name: Jermaine Acosta  MRN: [id number]  Age: 28 year old  : 1990  Referring provider: Pat Bell     Assessment and Plan:    Seronegative spondyloarthropathy  There is dramatic reduction in joint pain and dactylitis noted in May; improvement is tightly correlated with start of humira. Exam is benign today. Screens for hepatitis and TB performed prior to the onset of humira in 2018 were negative.     I  that symptoms of seronegative spondyloarthropathy are >90% suppressed with adalimumab monotherapy. The patient has already discontinued sulfasalazine and diclofenac due to his feeling so well; I agree with continuing off of these medications. He may use diclofenac 50mg twice a day as needed. There is no regular laboratory monitoring required for persons receiving humira monotherapy. We had a discussion about the established risks of long term humira, including risks of lymphoproliferative disorder and opportunistic infection. I also described the possibility of long term remission induced by a course of humira. I recommend continuous treatment with humira 40mg every other week for a period of at least 9 months. If at the end of that time, the patient's dramatic improvement has continued unabated, we can consider reducing the frequency of humira injections.     Plan:  - adalimumab (HUMIRA PEN) 40 MG/0.8ML pen kit  Dispense: 1 kit; Refill: 6     Follow-up: Return in about 6 months (around 2019).     HPI:   Jermaine Acosta has a history of seronegative spondyloarthropathy who presents for follow up. He was last seen on 18, at which time he recently ran out of his medication and experienced a flare in his right mid foot/medial ankle and some finger joints that onset 2 days after stopping his medication. Plan at that time was to add humira once  it was approved.    Today, the patient reports that 48 hours after his first dose of humira (first dose was 6-7 days after last visit), he had dramatic improvement. He feels amazing. All of his symptoms have been completely resolved and he cannot think of any residual issues. His bowels are normal, he denies any significant weight change, and he has no skin issues. He states that he has not changed his lifestyle at all, and that he has always been very physically active regardless of his pain. However, now that he is on humira, he finds that everything he does from going to the bathroom to lifting weights is painless and much easier than before. In fact, he believes that his physical performance has improved since starting humira, and believes that he has made significant progress in the gym after stopping antiinflammatory medications. He has recently had some sleep troubles attributed to family visiting. He notes that having a set daily routine is needed for him to sleep well and that having a relatively free summer has likely affected his sleep.     The only reaction he has to humira injections is slight tenderness that lasts around 1-2 hours after the injection and a small red erythema. He denies any itchiness or pain. His latest (5th) injection was yesterday. He reports feeling the same on day 1 after an injection all the way through day 13 before his next injection. He stopped taking sulfasalazine and diclofenac around 5 weeks ago.     Review of Systems:   Pertinent items are noted in HPI, remainder of complete ROS is negative.    No recent problems with hearing or vision. No swallowing problems.   No breathing difficulty, shortness of breath, coughing, or wheezing  No chest pain or palpitations  No heart burn, indigestion, abdominal pain, nausea, vomiting, diarrhea  No urination problems, no bloody, cloudy urine, no dysuria  No numbing, tingling, weakness  No headaches or confusion  No rashes. No easy bleeding  or bruising.    Active Medications:     Current Outpatient Prescriptions:      adalimumab (HUMIRA PEN) 40 MG/0.8ML pen kit, Inject 0.8 mLs (40 mg) Subcutaneous every 14 days Hold for signs of infection, and seek medical attention., Disp: 1 kit, Rfl: 6     diclofenac (VOLTAREN) 25 MG EC tablet, Take 3 tablets (75 mg) by mouth 2 times daily as needed for moderate pain 1 tab 75 mg DR tablets, twice daily (Patient not taking: Reported on 8/2/2018), Disp: 60 tablet, Rfl: 3     sulfaSALAzine (AZULFIDINE) 500 MG tablet, Take 2 tablets (1,000 mg) by mouth 3 times daily as needed 2 tabs twice daily (Patient not taking: Reported on 8/2/2018), Disp: 120 tablet, Rfl: 4     [DISCONTINUED] adalimumab (HUMIRA PEN) 40 MG/0.8ML pen kit, Inject 0.8 mLs (40 mg) Subcutaneous every 14 days Hold for signs of infection, and seek medical attention., Disp: 1 kit, Rfl: 3      Allergies:   Penicillins      Past Medical History:  Seronegative arthritis - treated with sulfasalazine and oral diclofenac, see note of May 2018     Past Surgical History:  Right ankle fracture  [place] teeth removal    Family History:   Father - rheumatoid arthritis      Social History:   No smoking or tobacco use  Moderate alcohol use, 3x/week  Works as  at [employer]     Physical Exam:   /80 (BP Location: Right arm)  Pulse 82  Temp 98.3  F (36.8  C) (Oral)  Wt 87.9 kg (193 lb 11.2 oz)  SpO2 99%  BMI 28.4 kg/m2   Wt Readings from Last 4 Encounters:   08/02/18 87.9 kg (193 lb 11.2 oz)   05/24/18 86.5 kg (190 lb 11.2 oz)   04/10/18 86.9 kg (191 lb 8 oz)   02/27/18 85.3 kg (188 lb)   Constitutional: Well-developed, appearing stated age; cooperative  Eyes: Normal EOM, PERRLA, vision, conjunctiva, sclera  ENT: Normal external ears, nose, hearing, lips, teeth, gums, throat. No mucous membrane lesions, normal saliva pool  Neck: No mass or thyroid enlargement  Resp: Lungs clear to auscultation, nl to palpation  CV: RRR, no murmurs, rubs or gallops,  no edema  GI: No ABD mass or tenderness, no HSM  Lymph: No cervical, supraclavicular, inguinal or epitrochlear nodes  MS: Lacks 20 degrees of extension on left wrist and 15 degrees on right wrist, close to full flexion in both wrists. The TMJ, neck, shoulder, elbow, MCP/PIP/DIP, spine, hip, knee, ankle, and foot MTP/IP joints were examined and found normal. No active synovitis or altered joint anatomy. Full joint ROM. Normal  strength. No dactylitis,  tenosynovitis, enthesopathy.  Skin: 0.5 cm poorly marginated erythema on thigh around site of needle puncture. No nail pitting, alopecia, rash, nodules or lesions  Neuro: Normal cranial nerves, strength, sensation, DTRs.   Psych: Normal judgement, orientation, memory, affect.    Laboratory:   Component      Latest Ref Rng & Units 5/24/2018   M Tuberculosis Result      NEG:Negative Negative   M Tuberculosis Antigen Value      IU/mL 0.01   Hepatitis C Antibody      NR:Nonreactive Nonreactive   Hep B Surface Agn      NR:Nonreactive Nonreactive     Scribe Disclosure:   I, Raul Forde, am serving as a scribe to document services personally performed by Tay Calderon MD at this visit, based upon the provider's statements to me. All documentation has been reviewed by the aforementioned provider prior to being entered into the official medical record.     Portions of this medical record were completed by a scribe. UPON MY REVIEW AND AUTHENTICATION BY ELECTRONIC SIGNATURE, this confirms (a) I performed the applicable clinical services, and (b) the record is accurate.  Tay Calderon MD  Staff Rheumatologist, [facility]

## 2019-02-01 ENCOUNTER — OFFICE VISIT (OUTPATIENT)
Dept: RHEUMATOLOGY | Facility: CLINIC | Age: 29
End: 2019-02-01
Attending: ORTHOPAEDIC SURGERY
Payer: COMMERCIAL

## 2019-02-01 VITALS
DIASTOLIC BLOOD PRESSURE: 79 MMHG | HEIGHT: 69 IN | HEART RATE: 79 BPM | SYSTOLIC BLOOD PRESSURE: 149 MMHG | OXYGEN SATURATION: 95 % | WEIGHT: 197.5 LBS | TEMPERATURE: 98.2 F | BODY MASS INDEX: 29.25 KG/M2

## 2019-02-01 DIAGNOSIS — M47.819 SERONEGATIVE SPONDYLOARTHROPATHY: Primary | ICD-10-CM

## 2019-02-01 PROCEDURE — G0463 HOSPITAL OUTPT CLINIC VISIT: HCPCS | Mod: ZF

## 2019-02-01 RX ORDER — FINASTERIDE 5 MG/1
TABLET, FILM COATED ORAL
Refills: 0 | COMMUNITY
Start: 2018-12-10

## 2019-02-01 ASSESSMENT — MIFFLIN-ST. JEOR: SCORE: 1855.19

## 2019-02-01 ASSESSMENT — PAIN SCALES - GENERAL: PAINLEVEL: NO PAIN (0)

## 2019-02-01 NOTE — PROGRESS NOTES
[facility]  Rheumatology Clinic  Tay Calderon MD  2019     Name: Jermaine Acosta  MRN: [id number]  Age: 28 year old  : 1990  Referring provider: Pat Bell     Assessment and Plan:  Seronegative spondyloarthropathy:  Patient reports continued complete absence of former dactylitis and tenosynovitis symptoms. Exam shows slight bony enlargement at right second PIP and at digits 3 and 4 on the right foot, but no evidence of altered range of motion or active inflammation.     Seronegative spondyloarthropathy is in remission. Near complete symptom control has been maintained since starting adalimumab in . We discussed a plan to make a trial of reducing Adalimumab to a minimum effective dose. I suggest reducing injection frequency to every 3 weeks for the next several months. Patient will report if he has new dosing cycle dependant symptoms. otherwise he will continue reduced scheduled dosing until our next visit in 6 months time.      Follow-up: Return in about 6 months (around 2019).     HPI:   Jermaine Acosta is a 28 year old male with a history of seronegative spondyloarthropathy who presents for follow up. Patient was last seen on 18 at which time he reported significant improvement in joint pain after beginning Humira. Plan at that time was to continue treatment with Humira 40mg every other week for a period of at least 9 months.    Today, he reports that has continued to experience significant relief with Humira treatment. He unintentionally went on a 3 week absence of a Humira injection and felt some of his symptoms return on the last week. This included mild MTP tenderness. He has been tolerating the injection well, but has some difficulty finding injection sites that he attributes to his muscular build. He notes that he has been able to lift significantly more weight since starting Humira. Denies any other musculoskeletal symptoms or eye problems.        Review of Systems:  "  Pertinent items are noted in HPI or as below, remainder of complete ROS is negative.      No recent problems with hearing or vision. No swallowing problems.   No breathing difficulty, shortness of breath, coughing, or wheezing  No chest pain or palpitations  No heart burn, indigestion, abdominal pain, nausea, vomiting, diarrhea  No urination problems, no bloody, cloudy urine, no dysuria  No numbing, tingling, weakness  No headaches or confusion  No rashes. No easy bleeding or bruising.     Active Medications:     Current Outpatient Medications:      adalimumab (HUMIRA PEN) 40 MG/0.8ML pen kit, Inject 0.8 mLs (40 mg) Subcutaneous every 14 days Hold for signs of infection, and seek medical attention., Disp: 1 kit, Rfl: 6     diclofenac (VOLTAREN) 25 MG EC tablet, Take 3 tablets (75 mg) by mouth 2 times daily as needed for moderate pain 1 tab 75 mg DR tablets, twice daily (Patient not taking: Reported on 8/2/2018), Disp: 60 tablet, Rfl: 3     sulfaSALAzine (AZULFIDINE) 500 MG tablet, Take 2 tablets (1,000 mg) by mouth 3 times daily as needed 2 tabs twice daily (Patient not taking: Reported on 8/2/2018), Disp: 120 tablet, Rfl: 4      Allergies:   Penicillins      Past Medical History:  Seronegative arthritis - treated with sulfasalazine and oral diclofenac, see note of May 2018       Past Surgical History:  Right ankle fracture; 2012  [place] teeth removal      Family History:   Father - rheumatoid arthritis         Social History:   No smoking or tobacco use  Moderate alcohol use, 3x/week  Works as  at [employer]        Physical Exam:   /79   Pulse 79   Temp 98.2  F (36.8  C) (Oral)   Ht 1.759 m (5' 9.25\")   Wt 89.6 kg (197 lb 8 oz)   SpO2 95%   BMI 28.96 kg/m     Wt Readings from Last 4 Encounters:   02/01/19 89.6 kg (197 lb 8 oz)   08/02/18 87.9 kg (193 lb 11.2 oz)   05/24/18 86.5 kg (190 lb 11.2 oz)   04/10/18 86.9 kg (191 lb 8 oz)     Constitutional: Well-developed, appearing stated age; " cooperative  Eyes: Normal EOM, PERRLA, vision, conjunctiva, sclera  ENT: Normal external ears, nose, hearing, lips, teeth, gums, throat. No mucous membrane lesions, normal saliva pool  Neck: No mass or thyroid enlargement  Resp: Lungs clear to auscultation, nl to palpation  CV: RRR, no murmurs, rubs or gallops, no edema  GI: No ABD mass or tenderness, no HSM  Lymph: No cervical, supraclavicular, inguinal or epitrochlear nodes  MS: The TMJ, neck, shoulder, elbow, MCP/PIP/DIP, spine, hip, knee, ankle, and foot MTP/IP joints were examined and found normal. No active synovitis or altered joint anatomy. Full joint ROM. Normal  strength. Tenosynovitis, enthesopathy. No visible swelling or asymmetry, no visible changes. Slight angulation at right second PIP and DIP. Some fusiform swelling, very modest, along proximal phalanx of the right second digit. Angulation at the right 3rd and 4th PIPs, but no evidence of dactylitis.   Skin: No nail pitting, alopecia, rash, nodules or lesions  Neuro: Normal cranial nerves, strength, sensation, DTRs.   Psych: Normal judgement, orientation, memory, affect.         Laboratory:   RHEUM RESULTS Latest Ref Rng & Units 2/27/2018 5/24/2018   SED RATE 0 - 15 mm/h 7 -   CRP, INFLAMMATION 0.0 - 8.0 mg/L 6.0 -   CK TOTAL 30 - 300 U/L 214 -   RHEUMATOID FACTOR <20 IU/mL <20 -   AST 0 - 45 U/L 28 -   ALT 0 - 70 U/L 46 -   ALBUMIN 3.4 - 5.0 g/dL 4.3 -   WBC 4.0 - 11.0 10e9/L 6.3 -   RBC 4.4 - 5.9 10e12/L 4.63 -   HGB 13.3 - 17.7 g/dL 15.2 -   HCT 40.0 - 53.0 % 45.4 -   MCV 78 - 100 fl 98 -   MCHC 31.5 - 36.5 g/dL 33.5 -   RDW 10.0 - 15.0 % 11.4 -    - 450 10e9/L 187 -   CREATININE 0.66 - 1.25 mg/dL 0.90 -   GFR ESTIMATE, IF BLACK >60 mL/min/1.7m2 >90 -   GFR ESTIMATE >60 mL/min/1.7m2 >90 -   HEPATITIS C ANTIBODY NR:Nonreactive - Nonreactive       Rheumatoid Factor   Date Value Ref Range Status   02/27/2018 <20 <20 IU/mL Final   ,   Cyclic Citrullinated Peptide Antibody, IgG   Date Value  Ref Range Status   02/27/2018 1 <7 U/mL Final     Comment:     Negative              Scribe Disclosure:   I, Neville Siva, am serving as a scribe to document services personally performed by Tay Calderon MD at this visit, based upon the provider's statements to me. All documentation has been reviewed by the aforementioned provider prior to being entered into the official medical record.     Portions of this medical record were completed by a scribe. UPON MY REVIEW AND AUTHENTICATION BY ELECTRONIC SIGNATURE, this confirms (a) I performed the applicable clinical services, and (b) the record is accurate.

## 2019-02-01 NOTE — LETTER
2019      RE: Jermaine Acosta  [address] [address]       [facility]  Rheumatology Clinic  Tay Calderon MD  2019     Name: Jermaine Acosta  MRN: [id number]  Age: 28 year old  : 1990  Referring provider: Pat Bell     Assessment and Plan:  Seronegative spondyloarthropathy:  Patient reports continued complete absence of former dactylitis and tenosynovitis symptoms. Exam shows slight bony enlargement at right second PIP and at digits 3 and 4 on the right foot, but no evidence of altered range of motion or active inflammation.     Seronegative spondyloarthropathy is in remission. Near complete symptom control has been maintained since starting adalimumab in . We discussed a plan to make a trial of reducing Adalimumab to a minimum effective dose. I suggest reducing injection frequency to every 3 weeks for the next several months. Patient will report if he has new dosing cycle dependant symptoms. otherwise he will continue reduced scheduled dosing until our next visit in 6 months time.      Follow-up: Return in about 6 months (around 2019).     HPI:   Jermaine Acosta is a 28 year old male with a history of seronegative spondyloarthropathy who presents for follow up. Patient was last seen on 18 at which time he reported significant improvement in joint pain after beginning Humira. Plan at that time was to continue treatment with Humira 40mg every other week for a period of at least 9 months.    Today, he reports that has continued to experience significant relief with Humira treatment. He unintentionally went on a 3 week absence of a Humira injection and felt some of his symptoms return on the last week. This included mild MTP tenderness. He has been tolerating the injection well, but has some difficulty finding injection sites that he attributes to his muscular build. He notes that he has been able to lift significantly more weight since starting Humira.  "Denies any other musculoskeletal symptoms or eye problems.        Review of Systems:   Pertinent items are noted in HPI or as below, remainder of complete ROS is negative.      No recent problems with hearing or vision. No swallowing problems.   No breathing difficulty, shortness of breath, coughing, or wheezing  No chest pain or palpitations  No heart burn, indigestion, abdominal pain, nausea, vomiting, diarrhea  No urination problems, no bloody, cloudy urine, no dysuria  No numbing, tingling, weakness  No headaches or confusion  No rashes. No easy bleeding or bruising.     Active Medications:     Current Outpatient Medications:      adalimumab (HUMIRA PEN) 40 MG/0.8ML pen kit, Inject 0.8 mLs (40 mg) Subcutaneous every 14 days Hold for signs of infection, and seek medical attention., Disp: 1 kit, Rfl: 6     diclofenac (VOLTAREN) 25 MG EC tablet, Take 3 tablets (75 mg) by mouth 2 times daily as needed for moderate pain 1 tab 75 mg DR tablets, twice daily (Patient not taking: Reported on 8/2/2018), Disp: 60 tablet, Rfl: 3     sulfaSALAzine (AZULFIDINE) 500 MG tablet, Take 2 tablets (1,000 mg) by mouth 3 times daily as needed 2 tabs twice daily (Patient not taking: Reported on 8/2/2018), Disp: 120 tablet, Rfl: 4      Allergies:   Penicillins      Past Medical History:  Seronegative arthritis - treated with sulfasalazine and oral diclofenac, see note of May 2018       Past Surgical History:  Right ankle fracture; 2012  [place] teeth removal      Family History:   Father - rheumatoid arthritis         Social History:   No smoking or tobacco use  Moderate alcohol use, 3x/week  Works as  at [employer]        Physical Exam:   /79   Pulse 79   Temp 98.2  F (36.8  C) (Oral)   Ht 1.759 m (5' 9.25\")   Wt 89.6 kg (197 lb 8 oz)   SpO2 95%   BMI 28.96 kg/m      Wt Readings from Last 4 Encounters:   02/01/19 89.6 kg (197 lb 8 oz)   08/02/18 87.9 kg (193 lb 11.2 oz)   05/24/18 86.5 kg (190 lb 11.2 oz) "   04/10/18 86.9 kg (191 lb 8 oz)     Constitutional: Well-developed, appearing stated age; cooperative  Eyes: Normal EOM, PERRLA, vision, conjunctiva, sclera  ENT: Normal external ears, nose, hearing, lips, teeth, gums, throat. No mucous membrane lesions, normal saliva pool  Neck: No mass or thyroid enlargement  Resp: Lungs clear to auscultation, nl to palpation  CV: RRR, no murmurs, rubs or gallops, no edema  GI: No ABD mass or tenderness, no HSM  Lymph: No cervical, supraclavicular, inguinal or epitrochlear nodes  MS: The TMJ, neck, shoulder, elbow, MCP/PIP/DIP, spine, hip, knee, ankle, and foot MTP/IP joints were examined and found normal. No active synovitis or altered joint anatomy. Full joint ROM. Normal  strength.  Tenosynovitis, enthesopathy. No visible swelling or asymmetry, no visible changes. Slight angulation at right second PIP and DIP. Some fusiform swelling, very modest, along proximal phalanx of the right second digit. Angulation at the right 3rd and 4th PIPs, but no evidence of dactylitis.   Skin: No nail pitting, alopecia, rash, nodules or lesions  Neuro: Normal cranial nerves, strength, sensation, DTRs.   Psych: Normal judgement, orientation, memory, affect.         Laboratory:   RHEUM RESULTS Latest Ref Rng & Units 2/27/2018 5/24/2018   SED RATE 0 - 15 mm/h 7 -   CRP, INFLAMMATION 0.0 - 8.0 mg/L 6.0 -   CK TOTAL 30 - 300 U/L 214 -   RHEUMATOID FACTOR <20 IU/mL <20 -   AST 0 - 45 U/L 28 -   ALT 0 - 70 U/L 46 -   ALBUMIN 3.4 - 5.0 g/dL 4.3 -   WBC 4.0 - 11.0 10e9/L 6.3 -   RBC 4.4 - 5.9 10e12/L 4.63 -   HGB 13.3 - 17.7 g/dL 15.2 -   HCT 40.0 - 53.0 % 45.4 -   MCV 78 - 100 fl 98 -   MCHC 31.5 - 36.5 g/dL 33.5 -   RDW 10.0 - 15.0 % 11.4 -    - 450 10e9/L 187 -   CREATININE 0.66 - 1.25 mg/dL 0.90 -   GFR ESTIMATE, IF BLACK >60 mL/min/1.7m2 >90 -   GFR ESTIMATE >60 mL/min/1.7m2 >90 -   HEPATITIS C ANTIBODY NR:Nonreactive - Nonreactive       Rheumatoid Factor   Date Value Ref Range Status    02/27/2018 <20 <20 IU/mL Final   ,   Cyclic Citrullinated Peptide Antibody, IgG   Date Value Ref Range Status   02/27/2018 1 <7 U/mL Final     Comment:     Negative         Scribe Disclosure:   I, Neville Paniagua, am serving as a scribe to document services personally performed by Tay Calderon MD at this visit, based upon the provider's statements to me. All documentation has been reviewed by the aforementioned provider prior to being entered into the official medical record.     Portions of this medical record were completed by a scribe. UPON MY REVIEW AND AUTHENTICATION BY ELECTRONIC SIGNATURE, this confirms (a) I performed the applicable clinical services, and (b) the record is accurate.      Tay Calderon MD

## 2019-02-01 NOTE — NURSING NOTE
"Chief Complaint   Patient presents with     RECHECK     6 month f/u for arthritis     /79   Pulse 79   Temp 98.2  F (36.8  C) (Oral)   Ht 1.759 m (5' 9.25\")   Wt 89.6 kg (197 lb 8 oz)   SpO2 95%   BMI 28.96 kg/m    Ness Rivera CMA    "

## 2019-02-01 NOTE — PATIENT INSTRUCTIONS
Dx: spondyloarthropathy, in remission.  Plan: reduce humira 40 mg subcutaneous to every 3 weeks. Call with results of trial in ~ 2 months.

## 2019-04-29 ENCOUNTER — TELEPHONE (OUTPATIENT)
Dept: RHEUMATOLOGY | Facility: CLINIC | Age: 29
End: 2019-04-29

## 2019-04-29 NOTE — TELEPHONE ENCOUNTER
PA Initiation    Medication: HUMIRA  Insurance Company: [facility] - Phone [phone] Fax [phone]  Pharmacy Filling the Rx: [facility] - [place], [place] - [facility] [address]  Filling Pharmacy Phone:    Filling Pharmacy Fax:    Start Date: 4/29/2019

## 2019-05-02 NOTE — TELEPHONE ENCOUNTER
Prior Authorization Approval    Authorization Effective Date: 5/25/2019  Authorization Expiration Date: 5/25/2020  Medication: HUMIRA - Approved  Approved Dose/Quantity:  2 for 28 days  Reference #: [id number]   Insurance Company: [employer] [place] - Phone [phone] Fax [phone]  Expected CoPay: $5.00     CoPay Card Available: Yes    Foundation Assistance Needed:    Which Pharmacy is filling the prescription (Not needed for infusion/clinic administered): [facility] - [facility] [address]  Pharmacy Notified: Yes   Patient Notified: Yes

## 2019-05-14 ENCOUNTER — TELEPHONE (OUTPATIENT)
Dept: RHEUMATOLOGY | Facility: CLINIC | Age: 29
End: 2019-05-14

## 2019-05-14 DIAGNOSIS — M13.0 POLYARTHRITIS: ICD-10-CM

## 2019-05-14 DIAGNOSIS — M47.819 SERONEGATIVE SPONDYLOARTHROPATHY: ICD-10-CM

## 2019-05-14 NOTE — TELEPHONE ENCOUNTER
HUMIRA  Last Written Prescription Date:  8/2/2018  Last Fill Quantity: 1kit   # refills: 6 refills  Last Office Visit: 2/01/2019  Future Office visit:  8/14/2019    CBC RESULTS:   Recent Labs   Lab Test 02/27/18  1114   WBC 6.3   RBC 4.63   HGB 15.2   HCT 45.4   MCV 98   MCH 32.8   MCHC 33.5   RDW 11.4          Creatinine   Date Value Ref Range Status   02/27/2018 0.90 0.66 - 1.25 mg/dL Final   ]    Liver Function Studies -   Recent Labs   Lab Test 02/27/18  1114   PROTTOTAL 7.8   ALBUMIN 4.3   BILITOTAL 0.9   ALKPHOS 68   AST 28   ALT 46       Routing refill request to provider for review/approval because:  Need Provider approval.    Rafaela Sebastian MSN, RN  Rheumatology RN Care Coordinator  [facility]

## 2019-05-14 NOTE — TELEPHONE ENCOUNTER
"Called  speciality pharmacy to put a \"rush\" on the Humira because Jermaine is due for his dose tomorrow.   pharmacy says they will reach out to Jermaine because they will need to schedule a delivery time and they will put a \"rush\" on this medication.     Rafaela Sebastian MSN, RN  Rheumatology RN Care Coordinator  [facility]      "

## 2019-08-15 ENCOUNTER — PRE VISIT (OUTPATIENT)
Dept: RHEUMATOLOGY | Facility: CLINIC | Age: 29
End: 2019-08-15

## 2019-08-15 NOTE — TELEPHONE ENCOUNTER
[facility] RHEUMATOLOGY  [address] [address]  [phone]  Dept: [phone]   __________________________________________________________________________________  Patient:  Jermaine Acosta   : 1990  MRN: [id number]  Encounter: 8/15/19       Pre Visit Assessment    Med List Reviewed and/or updated:  NO  Allergies Reviewed and/or updated:  NO  Pharmacy site Reviewed and/or updated:  NO                           Patient instructed/reminded to bring outside medical records and/or studies to the appointment: **Need to add SmartList NO  Problem Lists Reviewed and/or Updated:  NO    Notes:  Patient confirmed he/she will attend appointment:  YES   Appointment rescheduled?  NO  Task sent to scheduling?  NO    Sivan Horne, [facility]

## 2019-08-16 ENCOUNTER — OFFICE VISIT (OUTPATIENT)
Dept: RHEUMATOLOGY | Facility: CLINIC | Age: 29
End: 2019-08-16
Attending: INTERNAL MEDICINE
Payer: COMMERCIAL

## 2019-08-16 VITALS
DIASTOLIC BLOOD PRESSURE: 78 MMHG | HEIGHT: 70 IN | WEIGHT: 191.8 LBS | BODY MASS INDEX: 27.46 KG/M2 | OXYGEN SATURATION: 97 % | HEART RATE: 79 BPM | TEMPERATURE: 98.3 F | SYSTOLIC BLOOD PRESSURE: 119 MMHG

## 2019-08-16 DIAGNOSIS — M47.819 SERONEGATIVE SPONDYLOARTHROPATHY: ICD-10-CM

## 2019-08-16 DIAGNOSIS — M13.0 POLYARTHRITIS: ICD-10-CM

## 2019-08-16 LAB
ALT SERPL W P-5'-P-CCNC: 40 U/L (ref 0–70)
AST SERPL W P-5'-P-CCNC: 30 U/L (ref 0–45)
CREAT SERPL-MCNC: 0.96 MG/DL (ref 0.66–1.25)
ERYTHROCYTE [DISTWIDTH] IN BLOOD BY AUTOMATED COUNT: 11.5 % (ref 10–15)
GFR SERPL CREATININE-BSD FRML MDRD: >90 ML/MIN/{1.73_M2}
HCT VFR BLD AUTO: 46.9 % (ref 40–53)
HGB BLD-MCNC: 15.9 G/DL (ref 13.3–17.7)
MCH RBC QN AUTO: 33.1 PG (ref 26.5–33)
MCHC RBC AUTO-ENTMCNC: 33.9 G/DL (ref 31.5–36.5)
MCV RBC AUTO: 98 FL (ref 78–100)
PLATELET # BLD AUTO: 190 10E9/L (ref 150–450)
RBC # BLD AUTO: 4.8 10E12/L (ref 4.4–5.9)
WBC # BLD AUTO: 4.2 10E9/L (ref 4–11)

## 2019-08-16 PROCEDURE — 84460 ALANINE AMINO (ALT) (SGPT): CPT | Performed by: INTERNAL MEDICINE

## 2019-08-16 PROCEDURE — 85027 COMPLETE CBC AUTOMATED: CPT | Performed by: INTERNAL MEDICINE

## 2019-08-16 PROCEDURE — 36415 COLL VENOUS BLD VENIPUNCTURE: CPT | Performed by: INTERNAL MEDICINE

## 2019-08-16 PROCEDURE — G0463 HOSPITAL OUTPT CLINIC VISIT: HCPCS | Mod: ZF

## 2019-08-16 PROCEDURE — 84450 TRANSFERASE (AST) (SGOT): CPT | Performed by: INTERNAL MEDICINE

## 2019-08-16 PROCEDURE — 82565 ASSAY OF CREATININE: CPT | Performed by: INTERNAL MEDICINE

## 2019-08-16 ASSESSMENT — PAIN SCALES - GENERAL: PAINLEVEL: NO PAIN (0)

## 2019-08-16 ASSESSMENT — MIFFLIN-ST. JEOR: SCORE: 1836.25

## 2019-08-16 NOTE — PROGRESS NOTES
[facility]  Rheumatology Clinic  Tay Calderon MD  2019     Name: Jermaine Acosta  MRN: [id number]  Age: 29 year old  : 1990  Referring provider: Pat Bell     Assessment and Plan:  # Seronegative spondyloarthropathy:  Patient relates consistent end of doing cycle symptom recurrence over the past 6 months, as well as a 1 month period of consistent pain affecting his left foot and left hand/wrist. Exam is normal today.     Seronegative spondyloarthropathy is overall very well controlled with adalimumab monotherapy. Patient has tried every 21 day dosing, and had experienced increased frequency of dosing-cycle related symptoms in recent months. Complete suppression of inflammatory joint pain remains the goal. I recommend now consistently injecting Humira 40 mg sq every 18 days. I will check ALT, AST, CBC, and creatinine today.     Follow-up: Return in about 6 months (around 2020) with Dayday Hassan      HPI:   Jermaine Acosta is a 29 year old male with a history of seronegative spondyloarthropathy who presents for follow up. He was last seen on 02/10/2019, at which time seronegative spondyloarthropathy was in remission with near complete symptom control since starting adalimumab in . Plan was to reduce injection frequency to every 3 weeks.     Today, the patient reports that he decreased Humira frequency to every 3 weeks but in 2019 had a month of recurrent symptoms and painful injections. After about a month these improved and his Humira Rep felt that he may have gotten a bad dose. In the last couple of days before dosing, he does notice increased left wrist and thumb and left foot pain. He typically repeats Humira when he begins to notice symptoms. He is no longer using diclofenac.     He denies skin or nail changes, fevers, chills, sweats, cough, shortness of breath, or eye changes. He exercises vigorously multiple times per week without any limitations.     Review of Systems:  "  Pertinent items are noted in HPI or as below, remainder of complete ROS is negative.      No recent problems with hearing or vision. No swallowing problems.   No breathing difficulty, shortness of breath, coughing, or wheezing  No chest pain or palpitations  No heart burn, indigestion, abdominal pain, nausea, vomiting, diarrhea  No urination problems, no bloody, cloudy urine, no dysuria  No numbing, tingling, weakness  No headaches or confusion  No rashes. No easy bleeding or bruising.     Active Medications:     Current Outpatient Medications:      adalimumab (HUMIRA PEN) 40 MG/0.8ML pen kit, Inject 0.8 mLs (40 mg) Subcutaneous every 14 days Hold for signs of infection, and seek medical attention., Disp: 1 kit, Rfl: 6     finasteride (PROSCAR) 5 MG tablet, , Disp: , Rfl: 0     diclofenac (VOLTAREN) 25 MG EC tablet, Take 3 tablets (75 mg) by mouth 2 times daily as needed for moderate pain 1 tab 75 mg DR tablets, twice daily (Patient not taking: Reported on 8/2/2018), Disp: 60 tablet, Rfl: 3     sulfaSALAzine (AZULFIDINE) 500 MG tablet, Take 2 tablets (1,000 mg) by mouth 3 times daily as needed 2 tabs twice daily (Patient not taking: Reported on 8/2/2018), Disp: 120 tablet, Rfl: 4      Allergies:   Penicillins      Past Medical History:  Seronegative arthritis - treated with sulfasalazine and oral diclofenac, see note of May 2018     Past Surgical History:  Right ankle fracture; 2012  [place] teeth removal     Family History:   Father - rheumatoid arthritis      Social History:   No smoking or tobacco use  Moderate alcohol use, 3x/week  Works as  at [employer]     Physical Exam:   /83   Pulse 79   Temp 98.3  F (36.8  C) (Oral)   Ht 1.778 m (5' 10\")   Wt 87 kg (191 lb 12.8 oz)   SpO2 97%   BMI 27.52 kg/m     Wt Readings from Last 4 Encounters:   08/16/19 87 kg (191 lb 12.8 oz)   02/01/19 89.6 kg (197 lb 8 oz)   08/02/18 87.9 kg (193 lb 11.2 oz)   05/24/18 86.5 kg (190 lb 11.2 oz) "     Constitutional: Well-developed, appearing stated age; cooperative  Eyes: Normal EOM, PERRLA, vision, conjunctiva, sclera  ENT: Normal external ears, nose, hearing, lips, teeth, gums, throat. No mucous membrane lesions, normal saliva pool  Neck: No mass or thyroid enlargement  Resp: Lungs clear to auscultation, nl to palpation  CV: RRR, no murmurs, rubs or gallops, no edema  GI: No ABD mass or tenderness, no HSM  : Not tested  Lymph: No cervical, supraclavicular, inguinal or epitrochlear nodes  MS: No pain with palpation or circumduction of the thumb on the left or the right. There is reasonable symmetry of the toes. There is some fusiform swelling along the distal aspect of the right 3rd toe compared to the left. The TMJ, neck, shoulder, elbow, wrist, MCP/PIP/DIP, spine, hip, knee, ankle, and foot MTP/IP joints were examined and found normal. No active synovitis or altered joint anatomy. Full joint ROM. Normal  strength. No dactylitis,  tenosynovitis, enthesopathy.  Skin: No nail pitting, alopecia, rash, nodules or lesions  Neuro: Normal cranial nerves, strength, sensation, DTRs.   Psych: Normal judgement, orientation, memory, affect.     Laboratory:   RHEUM RESULTS Latest Ref Rng & Units 2/27/2018 5/24/2018   SED RATE 0 - 15 mm/h 7 -   CRP, INFLAMMATION 0.0 - 8.0 mg/L 6.0 -   CK TOTAL 30 - 300 U/L 214 -   RHEUMATOID FACTOR <20 IU/mL <20 -   AST 0 - 45 U/L 28 -   ALT 0 - 70 U/L 46 -   ALBUMIN 3.4 - 5.0 g/dL 4.3 -   WBC 4.0 - 11.0 10e9/L 6.3 -   RBC 4.4 - 5.9 10e12/L 4.63 -   HGB 13.3 - 17.7 g/dL 15.2 -   HCT 40.0 - 53.0 % 45.4 -   MCV 78 - 100 fl 98 -   MCHC 31.5 - 36.5 g/dL 33.5 -   RDW 10.0 - 15.0 % 11.4 -    - 450 10e9/L 187 -   CREATININE 0.66 - 1.25 mg/dL 0.90 -   GFR ESTIMATE, IF BLACK >60 mL/min/1.7m2 >90 -   GFR ESTIMATE >60 mL/min/1.7m2 >90 -   HEPATITIS C ANTIBODY NR:Nonreactive - Nonreactive       Rheumatoid Factor   Date Value Ref Range Status   02/27/2018 <20 <20 IU/mL Final     Cyclic  Citrullinated Peptide Antibody, IgG   Date Value Ref Range Status   02/27/2018 1 <7 U/mL Final     Comment:     Negative     Hep B Surface Agn   Date Value Ref Range Status   05/24/2018 Nonreactive NR^Nonreactive Final     Scribe Disclosure:  Marlene NICHOLSON, am serving as a scribe to document services personally performed by Tay Calderon MD at this visit, based upon the provider's statements to me. All documentation has been reviewed by the aforementioned provider prior to being entered into the official medical record.

## 2019-08-16 NOTE — NURSING NOTE
"Chief Complaint   Patient presents with     RECHECK     Seronegative spondyloarthropathy     /83   Pulse 79   Temp 98.3  F (36.8  C) (Oral)   Ht 1.778 m (5' 10\")   Wt 87 kg (191 lb 12.8 oz)   SpO2 97%   BMI 27.52 kg/m    Ronda Simmons [facility]  8/16/2019 10:59 AM      "

## 2019-08-16 NOTE — LETTER
2019      RE: Jermaine Acosta  [address] [address]       [facility]  Rheumatology Clinic  Tay Calderon MD  2019     Name: Jermaine Acosta  MRN: [id number]  Age: 29 year old  : 1990  Referring provider: Pat Bell     Assessment and Plan:  # Seronegative spondyloarthropathy:  Patient relates consistent end of doing cycle symptom recurrence over the past 6 months, as well as a 1 month period of consistent pain affecting his left foot and left hand/wrist. Exam is normal today.     Seronegative spondyloarthropathy is overall very well controlled with adalimumab monotherapy. Patient has tried every 21 day dosing, and had experienced increased frequency of dosing-cycle related symptoms in recent months. Complete suppression of inflammatory joint pain remains the goal. I recommend now consistently injecting Humira 40 mg sq every 18 days. I will check ALT, AST, CBC, and creatinine today.     Follow-up: Return in about 6 months (around 2020) with Dayday Hassan      HPI:   Jermaine Acosta is a 29 year old male with a history of seronegative spondyloarthropathy who presents for follow up.  He was last seen on 02/10/2019, at which time seronegative spondyloarthropathy was in remission with near complete symptom control since starting adalimumab in . Plan was to reduce injection frequency to every 3 weeks.     Today, the patient reports that he decreased Humira frequency to every 3 weeks but in 2019 had a month of recurrent symptoms and painful injections. After about a month these improved and his Humira Rep felt that he may have gotten a bad dose. In the last couple of days before dosing, he does notice increased left wrist and thumb and left foot pain. He typically repeats Humira when he begins to notice symptoms. He is no longer using diclofenac.     He denies skin or nail changes, fevers, chills, sweats, cough, shortness of breath, or eye changes. He exercises  "vigorously multiple times per week without any limitations.     Review of Systems:   Pertinent items are noted in HPI or as below, remainder of complete ROS is negative.      No recent problems with hearing or vision. No swallowing problems.   No breathing difficulty, shortness of breath, coughing, or wheezing  No chest pain or palpitations  No heart burn, indigestion, abdominal pain, nausea, vomiting, diarrhea  No urination problems, no bloody, cloudy urine, no dysuria  No numbing, tingling, weakness  No headaches or confusion  No rashes. No easy bleeding or bruising.     Active Medications:     Current Outpatient Medications:      adalimumab (HUMIRA PEN) 40 MG/0.8ML pen kit, Inject 0.8 mLs (40 mg) Subcutaneous every 14 days Hold for signs of infection, and seek medical attention., Disp: 1 kit, Rfl: 6     finasteride (PROSCAR) 5 MG tablet, , Disp: , Rfl: 0     diclofenac (VOLTAREN) 25 MG EC tablet, Take 3 tablets (75 mg) by mouth 2 times daily as needed for moderate pain 1 tab 75 mg DR tablets, twice daily (Patient not taking: Reported on 8/2/2018), Disp: 60 tablet, Rfl: 3     sulfaSALAzine (AZULFIDINE) 500 MG tablet, Take 2 tablets (1,000 mg) by mouth 3 times daily as needed 2 tabs twice daily (Patient not taking: Reported on 8/2/2018), Disp: 120 tablet, Rfl: 4      Allergies:   Penicillins      Past Medical History:  Seronegative arthritis - treated with sulfasalazine and oral diclofenac, see note of May 2018     Past Surgical History:  Right ankle fracture; 2012  [place] teeth removal     Family History:   Father - rheumatoid arthritis      Social History:   No smoking or tobacco use  Moderate alcohol use, 3x/week  Works as  at [employer]     Physical Exam:   /83   Pulse 79   Temp 98.3  F (36.8  C) (Oral)   Ht 1.778 m (5' 10\")   Wt 87 kg (191 lb 12.8 oz)   SpO2 97%   BMI 27.52 kg/m      Wt Readings from Last 4 Encounters:   08/16/19 87 kg (191 lb 12.8 oz)   02/01/19 89.6 kg (197 lb 8 oz) "   08/02/18 87.9 kg (193 lb 11.2 oz)   05/24/18 86.5 kg (190 lb 11.2 oz)     Constitutional: Well-developed, appearing stated age; cooperative  Eyes: Normal EOM, PERRLA, vision, conjunctiva, sclera  ENT: Normal external ears, nose, hearing, lips, teeth, gums, throat. No mucous membrane lesions, normal saliva pool  Neck: No mass or thyroid enlargement  Resp: Lungs clear to auscultation, nl to palpation  CV: RRR, no murmurs, rubs or gallops, no edema  GI: No ABD mass or tenderness, no HSM  : Not tested  Lymph: No cervical, supraclavicular, inguinal or epitrochlear nodes  MS: No pain with palpation or circumduction of the thumb on the left or the right. There is reasonable symmetry of the toes. There is some fusiform swelling along the distal aspect of the right 3rd toe compared to the left. The TMJ, neck, shoulder, elbow, wrist, MCP/PIP/DIP, spine, hip, knee, ankle, and foot MTP/IP joints were examined and found normal. No active synovitis or altered joint anatomy. Full joint ROM. Normal  strength. No dactylitis,  tenosynovitis, enthesopathy.  Skin: No nail pitting, alopecia, rash, nodules or lesions  Neuro: Normal cranial nerves, strength, sensation, DTRs.   Psych: Normal judgement, orientation, memory, affect.     Laboratory:   RHEUM RESULTS Latest Ref Rng & Units 2/27/2018 5/24/2018   SED RATE 0 - 15 mm/h 7 -   CRP, INFLAMMATION 0.0 - 8.0 mg/L 6.0 -   CK TOTAL 30 - 300 U/L 214 -   RHEUMATOID FACTOR <20 IU/mL <20 -   AST 0 - 45 U/L 28 -   ALT 0 - 70 U/L 46 -   ALBUMIN 3.4 - 5.0 g/dL 4.3 -   WBC 4.0 - 11.0 10e9/L 6.3 -   RBC 4.4 - 5.9 10e12/L 4.63 -   HGB 13.3 - 17.7 g/dL 15.2 -   HCT 40.0 - 53.0 % 45.4 -   MCV 78 - 100 fl 98 -   MCHC 31.5 - 36.5 g/dL 33.5 -   RDW 10.0 - 15.0 % 11.4 -    - 450 10e9/L 187 -   CREATININE 0.66 - 1.25 mg/dL 0.90 -   GFR ESTIMATE, IF BLACK >60 mL/min/1.7m2 >90 -   GFR ESTIMATE >60 mL/min/1.7m2 >90 -   HEPATITIS C ANTIBODY NR:Nonreactive - Nonreactive       Rheumatoid Factor    Date Value Ref Range Status   02/27/2018 <20 <20 IU/mL Final     Cyclic Citrullinated Peptide Antibody, IgG   Date Value Ref Range Status   02/27/2018 1 <7 U/mL Final     Comment:     Negative     Hep B Surface Agn   Date Value Ref Range Status   05/24/2018 Nonreactive NR^Nonreactive Final     Scribe Disclosure:  Marlene NICHOLSON, am serving as a scribe to document services personally performed by Tay Calderon MD at this visit, based upon the provider's statements to me. All documentation has been reviewed by the aforementioned provider prior to being entered into the official medical record.     Tay Calderon MD

## 2019-08-16 NOTE — PATIENT INSTRUCTIONS
Diagnosis: Seronegative spondyloarthropathy     Plan:   - Continue humira 40 mg subcutaneous between every 2-3 weeks   - Repeat labs with liver function, CBC, and kidney function

## 2019-12-12 ENCOUNTER — TELEPHONE (OUTPATIENT)
Dept: RHEUMATOLOGY | Facility: CLINIC | Age: 29
End: 2019-12-12

## 2019-12-12 NOTE — TELEPHONE ENCOUNTER
PA Initiation    Medication: HUMIRA  Insurance Company: [facility] - Phone [phone] Fax [phone]  Pharmacy Filling the Rx: [facility] - [place], [place] - [facility] [address]  Filling Pharmacy Phone:    Filling Pharmacy Fax:    Start Date: 12/12/2019

## 2019-12-13 NOTE — TELEPHONE ENCOUNTER
Prior Authorization Approval    Authorization Effective Date: 5/25/2019  Authorization Expiration Date: 5/25/2020  Medication: HUMIRA - Approved  Approved Dose/Quantity:  2 for 28 days  Reference #: [id number]   Insurance Company: [employer] [place] - Phone [phone] Fax [phone]  Expected CoPay: $5     CoPay Card Available:      Foundation Assistance Needed:    Which Pharmacy is filling the prescription (Not needed for infusion/clinic administered): [facility] - [place], [place] - [address]  Pharmacy Notified: Yes  Patient Notified: Yes

## 2020-01-22 ENCOUNTER — TELEPHONE (OUTPATIENT)
Dept: RHEUMATOLOGY | Facility: CLINIC | Age: 30
End: 2020-01-22

## 2020-01-29 ENCOUNTER — OFFICE VISIT (OUTPATIENT)
Dept: RHEUMATOLOGY | Facility: CLINIC | Age: 30
End: 2020-01-29
Attending: NURSE PRACTITIONER
Payer: COMMERCIAL

## 2020-01-29 VITALS
WEIGHT: 195.8 LBS | HEART RATE: 73 BPM | HEIGHT: 69 IN | DIASTOLIC BLOOD PRESSURE: 79 MMHG | OXYGEN SATURATION: 94 % | BODY MASS INDEX: 29 KG/M2 | SYSTOLIC BLOOD PRESSURE: 128 MMHG

## 2020-01-29 DIAGNOSIS — D84.9 IMMUNOSUPPRESSION (H): ICD-10-CM

## 2020-01-29 DIAGNOSIS — M47.819 SERONEGATIVE SPONDYLOARTHROPATHY: Primary | ICD-10-CM

## 2020-01-29 PROCEDURE — G0463 HOSPITAL OUTPT CLINIC VISIT: HCPCS | Mod: ZF

## 2020-01-29 ASSESSMENT — MIFFLIN-ST. JEOR: SCORE: 1842.48

## 2020-01-29 ASSESSMENT — PAIN SCALES - GENERAL: PAINLEVEL: NO PAIN (0)

## 2020-01-29 NOTE — PROGRESS NOTES
"Assessment and Plan:  # Seronegative spondyloarthropathy:  Patient relates consistent end of doing cycle symptom recurrence when takes less then every 14 days. controlled with adalimumab (humira). Site injection burning, soreness and \"knots\". Will change to citrate free. 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 Complete resolution of chronic migratory joint pain involving tenosynovitis, enthesopathy, and dactylitis. Adalimumab (humira) dramatic response. XR hands 2016 no erosions. No psoriasis, inflammatory bowel or back disease.      #Immunosuppression. 5-2018 neg hep B/C, MTB QG. Repeat MTB as does international travel. Future labs. Advised yearly physicals. Methotrexate or leflunomide (arava) contraindicated due to alcohol intake      Follow-up: Return in about 6 months with Dr. Calderon      HPI:   Jermaine Acosta is a 29 year old male with a history of seronegative spondyloarthropathy -RF -CCP who presents for follow up. 2/10/2019, at which time seronegative spondyloarthropathy was in remission with near complete symptom control since starting adalimumab in 05/18. Plan was to reduce injection frequency to every 3 weeks.     Past-treated with sulfasalazine and oral diclofenac, see note of May 2018 off since starting adalimumab (humira) 5-2018 January 29, 2020  Today, the patient reports that he decreased Humira frequency to every 3 weeks but in 03/2019 had a month of recurrent symptoms and painful injections. 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 Adalimumab (humira) 40 mg every 14 days, some \"knot\" at site, burning and painful with the injections. He would like to try the new formulation. Initial dx age 23 y/o seen at [facility], was on sulfasalazine and diclofenac which did not control his pain (+EAS, dactylitis of fingers and toes, tendonitis elbows and thumbs, severe in left wrist, severe feet " pain, ankles) then complete resolution within 36 hour of 1st injection.      He denies skin or nail changes, fevers, chills, sweats, cough, shortness of breath, or eye changes. He exercises vigorously multiple times weight lifts 5-6 day week, cardio as well without any limitations. No prednisone or NSAID use. Traveled to [facility] last May. Right 2nd finger and right 3rd toe never fully when down with adalimumab (humira) and some loss ROM right toe middle joint before the adalimumab (humira) . Some dry cracked skin in palms of hands, no psoriasis and nails are good.     Review of Systems:   Pertinent items are noted in HPI or as below, remainder of complete ROS is negative.    Skin: as above  Eyes: negative for, visual blurring, double vision, eye pain, photophobia, redness, tearing  Ears/Nose/Throat: negative  Respiratory: No shortness of breath, dyspnea on exertion, cough, or hemoptysis  Cardiovascular: negative  Gastrointestinal: negative  Genitourinary: negative  Musculoskeletal: as above  Neurologic: negative  Psychiatric: negative  Hematologic/Lymphatic/Immunologic: negative  Endocrine: negative    Active Medications:     Current Outpatient Medications:      adalimumab (HUMIRA *CF*) 40 MG/0.4ML pen kit, Inject 0.4 mLs (40 mg) Subcutaneous every 14 days Hold for signs of infection, then seek medical attention., Disp: 1 each, Rfl: 5     finasteride (PROSCAR) 5 MG tablet, , Disp: , Rfl: 0      Allergies:   Penicillins      Past Medical History:  Seronegative arthritis - treated with sulfasalazine and oral diclofenac, see note of May 2018     Past Surgical History:  Right ankle fracture; 2012, surgery  Dental implant  [place] teeth removal     Family History:   Mother - breast cancer survivor  Father - diagnosed rheumatoid arthritis   PGF- lung cancer 3 PPD  Paternal grandmother - hand arthritis   Aunt - knee arthritis   Great aunt - lupus   No history of psoriasis, psoriatic arthritis, reactive arthritis, or  "ankylosing spondylitis  No family history of diabetes mellitus, multiple sclerosis.         Social History:   . 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   Very active lifestyle.   Does not eat organ based meats. No shellfish.   Occasional alcohol consumption a few times/week. Generally drinks 8-10 drinks of vodka/whiskey per week.   No history of tobacco use. No recreational drug use.      Physical Exam:   /79   Pulse 73   Ht 1.759 m (5' 9.25\")   Wt 88.8 kg (195 lb 12.8 oz)   SpO2 94%   BMI 28.71 kg/m     Wt Readings from Last 4 Encounters:   01/29/20 88.8 kg (195 lb 12.8 oz)   08/16/19 87 kg (191 lb 12.8 oz)   02/01/19 89.6 kg (197 lb 8 oz)   08/02/18 87.9 kg (193 lb 11.2 oz)     Constitutional: Well-developed, appearing stated age; cooperative  Eyes: Normal EOM, PERRLA, vision, conjunctiva, sclera  ENT: Normal external ears, nose, hearing, lips, teeth, gums, throat. No mucous membrane lesions, normal saliva pool  Neck: No mass or thyroid enlargement  Resp: Lungs clear to auscultation, nl to palpation  CV: RRR, no murmurs, rubs or gallops, no edema  GI: No ABD mass or tenderness, no HSM  : Not tested  Lymph: No cervical, supraclavicular, inguinal or epitrochlear nodes  MS: No pain with palpation or circumduction of the thumb on the left or the right. There is reasonable symmetry of the toes. There is some fusiform swelling along the distal aspect of the right 3rd toe compared to the left and loss of ROM middle joint, right 3rd finger old tendonitis as not as normal as left. The TMJ, neck, shoulder, elbow, wrist, MCP/PIP/DIP, spine, hip, knee, ankle, and foot MTP/IP joints were examined and found normal. No active synovitis or altered joint anatomy. Full joint ROM. Normal  strength. No dactylitis,  tenosynovitis, enthesopathy.  Skin: No nail pitting, alopecia, rash, nodules or lesions  Neuro: " Normal cranial nerves, strength, sensation, DTRs.   Psych: Normal judgement, orientation, memory, affect.     Laboratory:   RHEUM RESULTS Latest Ref Rng & Units 2/27/2018 5/24/2018 8/16/2019   SED RATE 0 - 15 mm/h 7 - -   CRP, INFLAMMATION 0.0 - 8.0 mg/L 6.0 - -   CK TOTAL 30 - 300 U/L 214 - -   RHEUMATOID FACTOR <20 IU/mL <20 - -   AST 0 - 45 U/L 28 - 30   ALT 0 - 70 U/L 46 - 40   ALBUMIN 3.4 - 5.0 g/dL 4.3 - -   WBC 4.0 - 11.0 10e9/L 6.3 - 4.2   RBC 4.4 - 5.9 10e12/L 4.63 - 4.80   HGB 13.3 - 17.7 g/dL 15.2 - 15.9   HCT 40.0 - 53.0 % 45.4 - 46.9   MCV 78 - 100 fl 98 - 98   MCHC 31.5 - 36.5 g/dL 33.5 - 33.9   RDW 10.0 - 15.0 % 11.4 - 11.5    - 450 10e9/L 187 - 190   CREATININE 0.66 - 1.25 mg/dL 0.90 - 0.96   GFR ESTIMATE, IF BLACK >60 mL/min/[1.73:m2] >90 - >90   GFR ESTIMATE >60 mL/min/[1.73:m2] >90 - >90   HEPATITIS C ANTIBODY NR:Nonreactive - Nonreactive -       Rheumatoid Factor   Date Value Ref Range Status   02/27/2018 <20 <20 IU/mL Final     Cyclic Citrullinated Peptide Antibody, IgG   Date Value Ref Range Status   02/27/2018 1 <7 U/mL Final     Comment:     Negative     Hep B Surface Agn   Date Value Ref Range Status   05/24/2018 Nonreactive NR^Nonreactive Final

## 2020-01-29 NOTE — LETTER
"1/29/2020     RE: Jermaine Acosta  [address] [facility] [address]     Dear Colleague,    Thank you for referring your patient, Jermaine Acosta, to the [facility] RHEUMATOLOGY at [facility]. Please see a copy of my visit note below.    Assessment and Plan:  # Seronegative spondyloarthropathy:  Patient relates consistent end of doing cycle symptom recurrence when takes less then every 14 days. controlled with adalimumab (humira). Site injection burning, soreness and \"knots\". Will change to citrate free. 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 Complete resolution of chronic migratory joint pain involving tenosynovitis, enthesopathy, and dactylitis . Adalimumab (humira) dramatic response. XR hands 2016 no erosions. No psoriasis, inflammatory bowel or back disease.      #Immunosuppression. 5-2018 neg hep B/C, MTB QG. Repeat MTB as does international travel. Future labs. Advised yearly physicals. Methotrexate or leflunomide (arava) contraindicated due to alcohol intake      Follow-up: Return in about 6 months with Dr. Calderon      HPI:   Jermaine Acosta is a 29 year old male with a history of seronegative spondyloarthropathy -RF -CCP who presents for follow up. 2/10/2019, at which time seronegative spondyloarthropathy was in remission with near complete symptom control since starting adalimumab in 05/18. Plan was to reduce injection frequency to every 3 weeks.     Past-treated with sulfasalazine and oral diclofenac, see note of May 2018 off since starting adalimumab (humira) 5-2018 January 29, 2020  Today, the patient reports that he decreased Humira frequency to every 3 weeks but in 03/2019 had a month of recurrent symptoms and painful injections. 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 Adalimumab (humira) 40 mg every 14 days, some \"knot\" at site, burning and painful with " the injections. He would like to try the new formulation. Initial dx age 23 y/o seen at [facility], was on sulfasalazine and diclofenac which did not control his pain (+EAS, dactylitis of fingers and toes, tendonitis elbows and thumbs, severe in left wrist, severe feet pain, ankles) then complete resolution within 36 hour of 1st injection.      He denies skin or nail changes, fevers, chills, sweats, cough, shortness of breath, or eye changes. He exercises vigorously multiple times weight lifts 5-6 day week, cardio as well without any limitations. No prednisone or NSAID use. Traveled to [place] last May. Right 2nd finger and right 3rd toe never fully when down with adalimumab (humira) and some loss ROM right toe middle joint before the adalimumab (humira) . Some dry cracked skin in palms of hands, no psoriasis and nails are good.     Review of Systems:   Pertinent items are noted in HPI or as below, remainder of complete ROS is negative.    Skin: as above  Eyes: negative for, visual blurring, double vision, eye pain, photophobia, redness, tearing  Ears/Nose/Throat: negative  Respiratory: No shortness of breath, dyspnea on exertion, cough, or hemoptysis  Cardiovascular: negative  Gastrointestinal: negative  Genitourinary: negative  Musculoskeletal: as above  Neurologic: negative  Psychiatric: negative  Hematologic/Lymphatic/Immunologic: negative  Endocrine: negative    Active Medications:     Current Outpatient Medications:      adalimumab (HUMIRA *CF*) 40 MG/0.4ML pen kit, Inject 0.4 mLs (40 mg) Subcutaneous every 14 days Hold for signs of infection, then seek medical attention., Disp: 1 each, Rfl: 5     finasteride (PROSCAR) 5 MG tablet, , Disp: , Rfl: 0      Allergies:   Penicillins      Past Medical History:  Seronegative arthritis - treated with sulfasalazine and oral diclofenac, see note of May 2018     Past Surgical History:  Right ankle fracture; 2012, surgery  Dental implant  [place] teeth  "removal     Family History:   Mother - breast cancer survivor  Father - diagnosed rheumatoid arthritis   PGF- lung cancer 3 PPD  Paternal grandmother - hand arthritis   Aunt - knee arthritis   Great aunt - lupus   No history of psoriasis, psoriatic arthritis, reactive arthritis, or ankylosing spondylitis  No family history of diabetes mellitus, multiple sclerosis.         Social History:   . 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   Very active lifestyle.   Does not eat organ based meats. No shellfish.   Occasional alcohol consumption a few times/week. Generally drinks 8-10 drinks of vodka/whiskey per week.   No history of tobacco use. No recreational drug use.      Physical Exam:   /79   Pulse 73   Ht 1.759 m (5' 9.25\")   Wt 88.8 kg (195 lb 12.8 oz)   SpO2 94%   BMI 28.71 kg/m      Wt Readings from Last 4 Encounters:   01/29/20 88.8 kg (195 lb 12.8 oz)   08/16/19 87 kg (191 lb 12.8 oz)   02/01/19 89.6 kg (197 lb 8 oz)   08/02/18 87.9 kg (193 lb 11.2 oz)     Constitutional: Well-developed, appearing stated age; cooperative  Eyes: Normal EOM, PERRLA, vision, conjunctiva, sclera  ENT: Normal external ears, nose, hearing, lips, teeth, gums, throat. No mucous membrane lesions, normal saliva pool  Neck: No mass or thyroid enlargement  Resp: Lungs clear to auscultation, nl to palpation  CV: RRR, no murmurs, rubs or gallops, no edema  GI: No ABD mass or tenderness, no HSM  : Not tested  Lymph: No cervical, supraclavicular, inguinal or epitrochlear nodes  MS: No pain with palpation or circumduction of the thumb on the left or the right. There is reasonable symmetry of the toes. There is some fusiform swelling along the distal aspect of the right 3rd toe compared to the left and loss of ROM middle joint, right 3rd finger old tendonitis as not as normal as left. The TMJ, neck, shoulder, elbow, wrist, " MCP/PIP/DIP, spine, hip, knee, ankle, and foot MTP/IP joints were examined and found normal. No active synovitis or altered joint anatomy. Full joint ROM. Normal  strength. No dactylitis,  tenosynovitis, enthesopathy.  Skin: No nail pitting, alopecia, rash, nodules or lesions  Neuro: Normal cranial nerves, strength, sensation, DTRs.   Psych: Normal judgement, orientation, memory, affect.     Laboratory:   RHEUM RESULTS Latest Ref Rng & Units 2/27/2018 5/24/2018 8/16/2019   SED RATE 0 - 15 mm/h 7 - -   CRP, INFLAMMATION 0.0 - 8.0 mg/L 6.0 - -   CK TOTAL 30 - 300 U/L 214 - -   RHEUMATOID FACTOR <20 IU/mL <20 - -   AST 0 - 45 U/L 28 - 30   ALT 0 - 70 U/L 46 - 40   ALBUMIN 3.4 - 5.0 g/dL 4.3 - -   WBC 4.0 - 11.0 10e9/L 6.3 - 4.2   RBC 4.4 - 5.9 10e12/L 4.63 - 4.80   HGB 13.3 - 17.7 g/dL 15.2 - 15.9   HCT 40.0 - 53.0 % 45.4 - 46.9   MCV 78 - 100 fl 98 - 98   MCHC 31.5 - 36.5 g/dL 33.5 - 33.9   RDW 10.0 - 15.0 % 11.4 - 11.5    - 450 10e9/L 187 - 190   CREATININE 0.66 - 1.25 mg/dL 0.90 - 0.96   GFR ESTIMATE, IF BLACK >60 mL/min/[1.73:m2] >90 - >90   GFR ESTIMATE >60 mL/min/[1.73:m2] >90 - >90   HEPATITIS C ANTIBODY NR:Nonreactive - Nonreactive -       Rheumatoid Factor   Date Value Ref Range Status   02/27/2018 <20 <20 IU/mL Final     Cyclic Citrullinated Peptide Antibody, IgG   Date Value Ref Range Status   02/27/2018 1 <7 U/mL Final     Comment:     Negative     Hep B Surface Agn   Date Value Ref Range Status   05/24/2018 Nonreactive NR^Nonreactive Final       Again, thank you for allowing me to participate in the care of your patient.      Sincerely,    Dayday Hassan, APRN CNP

## 2020-01-29 NOTE — LETTER
Date:February 3, 2020      Patient was self referred, no letter generated. Do not send.        [facility] Information

## 2020-01-29 NOTE — LETTER
"1/29/2020      RE: Jermaine Acosta  [address] [address] [facility] [address]       Assessment and Plan:  # Seronegative spondyloarthropathy:  Patient relates consistent end of doing cycle symptom recurrence when takes less then every 14 days. controlled with adalimumab (humira). Site injection burning, soreness and \"knots\". Will change to citrate free. 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 Complete resolution of chronic migratory joint pain involving tenosynovitis, enthesopathy, and dactylitis . Adalimumab (humira) dramatic response. XR hands 2016 no erosions. No psoriasis, inflammatory bowel or back disease.      #Immunosuppression. 5-2018 neg hep B/C, MTB QG. Repeat MTB as does international travel. Future labs. Advised yearly physicals. Methotrexate or leflunomide (arava) contraindicated due to alcohol intake      Follow-up: Return in about 6 months with Dr. Calderon      HPI:   Jermaine Acosta is a 29 year old male with a history of seronegative spondyloarthropathy -RF -CCP who presents for follow up. 2/10/2019, at which time seronegative spondyloarthropathy was in remission with near complete symptom control since starting adalimumab in 05/18. Plan was to reduce injection frequency to every 3 weeks.     Past-treated with sulfasalazine and oral diclofenac, see note of May 2018 off since starting adalimumab (humira) 5-2018 January 29, 2020  Today, the patient reports that he decreased Humira frequency to every 3 weeks but in 03/2019 had a month of recurrent symptoms and painful injections. 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 Adalimumab (humira) 40 mg every 14 days, some \"knot\" at site, burning and painful with the injections. He would like to try the new formulation. Initial dx age 23 y/o seen at [facility], was on sulfasalazine and diclofenac which did not control his pain (+EAS, dactylitis " of fingers and toes, tendonitis elbows and thumbs, severe in left wrist, severe feet pain, ankles) then complete resolution within 36 hour of 1st injection.      He denies skin or nail changes, fevers, chills, sweats, cough, shortness of breath, or eye changes. He exercises vigorously multiple times weight lifts 5-6 day week, cardio as well without any limitations. No prednisone or NSAID use. Traveled to [facility] last May. Right 2nd finger and right 3rd toe never fully when down with adalimumab (humira) and some loss ROM right toe middle joint before the adalimumab (humira) . Some dry cracked skin in palms of hands, no psoriasis and nails are good.     Review of Systems:   Pertinent items are noted in HPI or as below, remainder of complete ROS is negative.    Skin: as above  Eyes: negative for, visual blurring, double vision, eye pain, photophobia, redness, tearing  Ears/Nose/Throat: negative  Respiratory: No shortness of breath, dyspnea on exertion, cough, or hemoptysis  Cardiovascular: negative  Gastrointestinal: negative  Genitourinary: negative  Musculoskeletal: as above  Neurologic: negative  Psychiatric: negative  Hematologic/Lymphatic/Immunologic: negative  Endocrine: negative    Active Medications:     Current Outpatient Medications:      adalimumab (HUMIRA *CF*) 40 MG/0.4ML pen kit, Inject 0.4 mLs (40 mg) Subcutaneous every 14 days Hold for signs of infection, then seek medical attention., Disp: 1 each, Rfl: 5     finasteride (PROSCAR) 5 MG tablet, , Disp: , Rfl: 0      Allergies:   Penicillins      Past Medical History:  Seronegative arthritis - treated with sulfasalazine and oral diclofenac, see note of May 2018     Past Surgical History:  Right ankle fracture; 2012, surgery  Dental implant  Berry teeth removal     Family History:   Mother - breast cancer survivor  Father - diagnosed rheumatoid arthritis   PGF- lung cancer 3 PPD  Paternal grandmother - hand arthritis   Aunt - knee arthritis   Great aunt -  "lupus   No history of psoriasis, psoriatic arthritis, reactive arthritis, or ankylosing spondylitis  No family history of diabetes mellitus, multiple sclerosis.         Social History:   . 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   Very active lifestyle.   Does not eat organ based meats. No shellfish.   Occasional alcohol consumption a few times/week. Generally drinks 8-10 drinks of vodka/whiskey per week.   No history of tobacco use. No recreational drug use.      Physical Exam:   /79   Pulse 73   Ht 1.759 m (5' 9.25\")   Wt 88.8 kg (195 lb 12.8 oz)   SpO2 94%   BMI 28.71 kg/m      Wt Readings from Last 4 Encounters:   01/29/20 88.8 kg (195 lb 12.8 oz)   08/16/19 87 kg (191 lb 12.8 oz)   02/01/19 89.6 kg (197 lb 8 oz)   08/02/18 87.9 kg (193 lb 11.2 oz)     Constitutional: Well-developed, appearing stated age; cooperative  Eyes: Normal EOM, PERRLA, vision, conjunctiva, sclera  ENT: Normal external ears, nose, hearing, lips, teeth, gums, throat. No mucous membrane lesions, normal saliva pool  Neck: No mass or thyroid enlargement  Resp: Lungs clear to auscultation, nl to palpation  CV: RRR, no murmurs, rubs or gallops, no edema  GI: No ABD mass or tenderness, no HSM  : Not tested  Lymph: No cervical, supraclavicular, inguinal or epitrochlear nodes  MS: No pain with palpation or circumduction of the thumb on the left or the right. There is reasonable symmetry of the toes. There is some fusiform swelling along the distal aspect of the right 3rd toe compared to the left and loss of ROM middle joint, right 3rd finger old tendonitis as not as normal as left. The TMJ, neck, shoulder, elbow, wrist, MCP/PIP/DIP, spine, hip, knee, ankle, and foot MTP/IP joints were examined and found normal. No active synovitis or altered joint anatomy. Full joint ROM. Normal  strength. No dactylitis,  tenosynovitis, " enthesopathy.  Skin: No nail pitting, alopecia, rash, nodules or lesions  Neuro: Normal cranial nerves, strength, sensation, DTRs.   Psych: Normal judgement, orientation, memory, affect.     Laboratory:   RHEUM RESULTS Latest Ref Rng & Units 2/27/2018 5/24/2018 8/16/2019   SED RATE 0 - 15 mm/h 7 - -   CRP, INFLAMMATION 0.0 - 8.0 mg/L 6.0 - -   CK TOTAL 30 - 300 U/L 214 - -   RHEUMATOID FACTOR <20 IU/mL <20 - -   AST 0 - 45 U/L 28 - 30   ALT 0 - 70 U/L 46 - 40   ALBUMIN 3.4 - 5.0 g/dL 4.3 - -   WBC 4.0 - 11.0 10e9/L 6.3 - 4.2   RBC 4.4 - 5.9 10e12/L 4.63 - 4.80   HGB 13.3 - 17.7 g/dL 15.2 - 15.9   HCT 40.0 - 53.0 % 45.4 - 46.9   MCV 78 - 100 fl 98 - 98   MCHC 31.5 - 36.5 g/dL 33.5 - 33.9   RDW 10.0 - 15.0 % 11.4 - 11.5    - 450 10e9/L 187 - 190   CREATININE 0.66 - 1.25 mg/dL 0.90 - 0.96   GFR ESTIMATE, IF BLACK >60 mL/min/[1.73:m2] >90 - >90   GFR ESTIMATE >60 mL/min/[1.73:m2] >90 - >90   HEPATITIS C ANTIBODY NR:Nonreactive - Nonreactive -       Rheumatoid Factor   Date Value Ref Range Status   02/27/2018 <20 <20 IU/mL Final     Cyclic Citrullinated Peptide Antibody, IgG   Date Value Ref Range Status   02/27/2018 1 <7 U/mL Final     Comment:     Negative     Hep B Surface Agn   Date Value Ref Range Status   05/24/2018 Nonreactive NR^Nonreactive Final       Dayday Hassan, APRN CNP

## 2020-03-11 ENCOUNTER — HEALTH MAINTENANCE LETTER (OUTPATIENT)
Age: 30
End: 2020-03-11

## 2020-05-28 ENCOUNTER — TELEPHONE (OUTPATIENT)
Dept: RHEUMATOLOGY | Facility: CLINIC | Age: 30
End: 2020-05-28

## 2020-05-28 NOTE — TELEPHONE ENCOUNTER
[facility] Prior Authorization Team   Phone: [phone]  Fax: [phone]    PA Initiation    Medication: Humira  Insurance Company: [employer] [place] - Phone [phone] Fax [phone]  Pharmacy Filling the Rx: [facility] - [place], [place] - [address]  Filling Pharmacy Phone: [phone]  Filling Pharmacy Fax: [phone]  Start Date: 5/28/2020

## 2020-06-02 NOTE — TELEPHONE ENCOUNTER
Prior Authorization Approval    Authorization Effective Date: 5/28/2020  Authorization Expiration Date: 5/28/2021  Medication: Humira  Approved Dose/Quantity: ud  Reference #: [id number]   Insurance Company: [employer] [place] - Phone [phone] Fax [phone]  Expected CoPay: $5     CoPay Card Available: Yes    Foundation Assistance Needed:    Which Pharmacy is filling the prescription (Not needed for infusion/clinic administered): [facility] - [place] [address]  Pharmacy Notified: Yes  Patient Notified: Yes       36.7

## 2020-07-27 DIAGNOSIS — M47.819 SERONEGATIVE SPONDYLOARTHROPATHY: ICD-10-CM

## 2020-07-27 NOTE — PROGRESS NOTES
[facility]  Rheumatology Clinic  Tay Calderon MD  2020     Name: Jermaine Acosta  MRN: [id number]  Age: 29 year old  : 1990  Referring provider: Pat Bell     Assessment and Plan:  # Seronegative spondyloarthropathy:  Patient relates Humira dosing cycle dependent symptoms increasing over the past 6 months, as well as left base of thumb pain, left heel pain, and left middle toe discomfort and swelling.  Video examination today shows redness of the left third toe without significant fusiform swelling; range of motion is preserved in all visualized joints.  Last blood work was performed in 2019, when creatinine, transaminases, and CBC were all normal.    Seronegative spondyloarthropathy may be less well controlled on Humira than it was formerly, as an explanation for accumulating symptoms in multiple joints and entheses.  Alternative explanations include multiple overuse syndromes, such as plantar fasciitis associated with the continuing vigorous physical activity regimen pursued by the patient, or that spondyloarthropathy is flaring to a intensity level that is temporarily exceeds the capacity for Humira to suppress inflammatory symptoms.  We discussed a plan to use nonpharmacologic measures including altered shoe gear and shoe inserts for the left foot, and bracing for the left thumb, and talked about a plan to supplement Humira initially with as needed nonsteroidal therapy.  If escalating requirements for nonsteroidals occur, addition or substitution of a second disease modifying agent would be in order.    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  The presence of antibodies could allow him to plan for safe participation in teaching responsibilities.     # Coccygeal pain: The transient, positional  "nature of the pain argues against causal association with systemic rheumatic disease.  I will check urinalysis.  We discussed the following plan in detail:    Plan:  1.  Check inflammatory markers, blood counts and kidney function, coronavirus antibodies.  2.  Continue Humira 40 mg subcu every other week.  3.  Consider replacing running shoes, trial of Tuli heel inserts, trial of off-the-shelf orthotics for left sole of foot pain.  4.  Naproxen 220 to 440 mg up to twice daily as needed foot, thumb, and toe pain unresponsive to Humira.  5. Sars-CoV2 antibody test    Follow-up: Return in about 3 months    HPI:   Jermaine Acosta has a history of seronegative spondyloarthropathy; he presents for follow up. He was last seen in 1-2020, at which time seronegative spondyloarthropathy was in remission with near complete symptom control since starting adalimumab in 05/18.  However, he had experienced recurrent symptoms when attempting to reduce frequency of adalimumab injections to an every 3-week interval..  Plan was to resume use of adalimumab every 14 to 18 days.    Interval history 07-:    He notes modest increase in msk symptoms. He notes dosing cycle-dependent symtpoms, around days 7-8 after injection. He has pain at medial heel on L; also notes pain L foot 3rd MTP \"like a rock under the ball\" under the foot. L thumb base is also \"hotspot\". Just in past several months, notes \"coccyx\" pain, very sharp, worse after he rises from a sitting position. Location above the rectum, but not outside. Pain temporary and sharp. Not present in the morning. No early morning stiffness.    He denies skin or nail changes, fevers, chills, sweats, cough, shortness of breath, or eye changes. He exercises vigorously multiple times per week without any limitations. No urinary symptoms.    Prior history:    1-2020:  Today, the patient reports that he decreased Humira frequency to every 3 weeks but in 03/2019 had a month of recurrent " symptoms and painful injections. After about a month these improved and his Humira Rep felt that he may have gotten a bad dose. In the last couple of days before dosing, he does notice increased left wrist and thumb and left foot pain. He typically repeats Humira when he begins to notice symptoms. He is no longer using diclofenac.     He denies skin or nail changes, fevers, chills, sweats, cough, shortness of breath, or eye changes. He exercises vigorously multiple times per week without any limitations.     Review of Systems:   Pertinent items are noted in HPI or as below, remainder of complete ROS is negative.      No recent problems with hearing or vision. No swallowing problems.   No breathing difficulty, shortness of breath, coughing, or wheezing  No chest pain or palpitations  No heart burn, indigestion, abdominal pain, nausea, vomiting, diarrhea  No urination problems, no bloody, cloudy urine, no dysuria  No numbing, tingling, weakness  No headaches or confusion  No rashes. No easy bleeding or bruising.     Active Medications:     Current Outpatient Medications:      adalimumab (HUMIRA *CF*) 40 MG/0.4ML pen kit, Inject 0.4 mLs (40 mg) Subcutaneous every 14 days Hold for signs of infection, then seek medical attention., Disp: 1 each, Rfl: 5     finasteride (PROSCAR) 5 MG tablet, , Disp: , Rfl: 0      Allergies:   Penicillins      Past Medical History:  Seronegative arthritis - treated with sulfasalazine and oral diclofenac, see note of May 2018     Past Surgical History:  Right ankle fracture; 2012  [place] teeth removal     Family History:   Father - rheumatoid arthritis   GF and mother: cancer     Social History:   No smoking or tobacco use  Moderate alcohol use, 3x/week  Works as  at [employer]     Physical Exam:   There were no vitals taken for this visit.   Wt Readings from Last 4 Encounters:   01/29/20 88.8 kg (195 lb 12.8 oz)   08/16/19 87 kg (191 lb 12.8 oz)   02/01/19 89.6 kg (197 lb 8  oz)   08/02/18 87.9 kg (193 lb 11.2 oz)     Constitutional: Well-developed, appearing stated age; cooperative  Eyes: Normal EOM, PERRLA, vision, conjunctiva, sclera  ENT: Normal external ears, nose, hearing, lips, teeth, gums, throat. No mucous membrane lesions, normal saliva pool  Neck: No mass or thyroid enlargement  MS: He points to the left first CMC joint as a point of tenderness and pain with repetitive use.  He points to the inferior aspect of the left foot sole approximately 4 cm anterior to the Achilles insertion at the posterior terminus of the longitudinal arch.  Right wrist lacks 20 degrees of extension.  Otherwise, excellent fist formation, and full range of motion in elbows shoulders ankles and feet.  Skin: No nail pitting, alopecia, rash, nodules or lesions  Neuro: Normal cranial nerves  Psych: Normal judgement, orientation, memory, affect.     Laboratory:   RHEUM RESULTS Latest Ref Rng & Units 2/27/2018 5/24/2018 8/16/2019   SED RATE 0 - 15 mm/h 7 - -   CRP, INFLAMMATION 0.0 - 8.0 mg/L 6.0 - -   CK TOTAL 30 - 300 U/L 214 - -   RHEUMATOID FACTOR <20 IU/mL <20 - -   AST 0 - 45 U/L 28 - 30   ALT 0 - 70 U/L 46 - 40   ALBUMIN 3.4 - 5.0 g/dL 4.3 - -   WBC 4.0 - 11.0 10e9/L 6.3 - 4.2   RBC 4.4 - 5.9 10e12/L 4.63 - 4.80   HGB 13.3 - 17.7 g/dL 15.2 - 15.9   HCT 40.0 - 53.0 % 45.4 - 46.9   MCV 78 - 100 fl 98 - 98   MCHC 31.5 - 36.5 g/dL 33.5 - 33.9   RDW 10.0 - 15.0 % 11.4 - 11.5    - 450 10e9/L 187 - 190   CREATININE 0.66 - 1.25 mg/dL 0.90 - 0.96   GFR ESTIMATE, IF BLACK >60 mL/min/[1.73:m2] >90 - >90   GFR ESTIMATE >60 mL/min/[1.73:m2] >90 - >90   HEPATITIS C ANTIBODY NR:Nonreactive - Nonreactive -       Rheumatoid Factor   Date Value Ref Range Status   02/27/2018 <20 <20 IU/mL Final     Cyclic Citrullinated Peptide Antibody, IgG   Date Value Ref Range Status   02/27/2018 1 <7 U/mL Final     Comment:     Negative     Hep B Surface Agn   Date Value Ref Range Status   05/24/2018 Nonreactive  "NR^Nonreactive Final     Patient stated he already went through E-check in on my chart - and was unable to speak with writer at the time of the call.         Jermaine Acosta is a 30 year old male who is being evaluated via a billable video visit.      The patient has been notified of following:     \"This video visit will be conducted via a call between you and your physician/provider. We have found that certain health care needs can be provided without the need for an in-person physical exam.  This service lets us provide the care you need with a video conversation.  If a prescription is necessary we can send it directly to your pharmacy.  If lab work is needed we can place an order for that and you can then stop by our lab to have the test done at a later time.    Video visits are billed at different rates depending on your insurance coverage.  Please reach out to your insurance provider with any questions.    If during the course of the call the physician/provider feels a video visit is not appropriate, you will not be charged for this service.\"    Patient has given verbal consent for Video visit? Yes    Will anyone else be joining your video visit? No        Video-Visit Details    Type of service:  Video Visit    Video Start Time: 2:11 PM  Video End Time: 2:40 PM    Originating Location (pt. Location): Home    Distant Location (provider location):  [facility] RHEUMATOLOGY     Platform used for Video Visit: Lyndon Calderon MD        "

## 2020-07-28 ENCOUNTER — VIRTUAL VISIT (OUTPATIENT)
Dept: RHEUMATOLOGY | Facility: CLINIC | Age: 30
End: 2020-07-28
Attending: INTERNAL MEDICINE
Payer: COMMERCIAL

## 2020-07-28 DIAGNOSIS — M47.819 SERONEGATIVE SPONDYLOARTHROPATHY: Primary | ICD-10-CM

## 2020-07-28 DIAGNOSIS — G89.29 CHRONIC COCCYGEAL PAIN: ICD-10-CM

## 2020-07-28 DIAGNOSIS — M53.3 CHRONIC COCCYGEAL PAIN: ICD-10-CM

## 2020-07-28 NOTE — LETTER
2020       RE: Jermaine Acosta  [address] [facility] [address]     Dear Colleague,    Thank you for referring your patient, Jermaine Acosta, to the [facility] RHEUMATOLOGY at [facility]. Please see a copy of my visit note below.    [facility]  Rheumatology Clinic  Tay Calderon MD  2020     Name: Jermaine Acosta  MRN: [id number]  Age: 29 year old  : 1990  Referring provider: Pat Bell     Assessment and Plan:  # Seronegative spondyloarthropathy:  Patient relates Humira dosing cycle dependent symptoms increasing over the past 6 months, as well as left base of thumb pain, left heel pain, and left middle toe discomfort and swelling.  Video examination today shows redness of the left third toe without significant fusiform swelling; range of motion is preserved in all visualized joints.  Last blood work was performed in 2019, when creatinine, transaminases, and CBC were all normal.    Seronegative spondyloarthropathy may be less well controlled on Humira than it was formerly, as an explanation for accumulating symptoms in multiple joints and entheses.  Alternative explanations include multiple overuse syndromes, such as plantar fasciitis associated with the continuing vigorous physical activity regimen pursued by the patient, or that spondyloarthropathy is flaring to a intensity level that is temporarily exceeds the capacity for Humira to suppress inflammatory symptoms.  We discussed a plan to use nonpharmacologic measures including altered shoe gear and shoe inserts for the left foot, and bracing for the left thumb, and talked about a plan to supplement Humira initially with as needed nonsteroidal therapy.  If escalating requirements for nonsteroidals occur, addition or substitution of a second disease modifying agent would be in order.    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  The presence of antibodies could allow him to plan for safe participation in teaching responsibilities.     # Coccygeal pain: The transient, positional nature of the pain argues against causal association with systemic rheumatic disease.  I will check urinalysis.  We discussed the following plan in detail:    Plan:  1.  Check inflammatory markers, blood counts and kidney function, coronavirus antibodies.  2.  Continue Humira 40 mg subcu every other week.  3.  Consider replacing running shoes, trial of Tuli heel inserts, trial of off-the-shelf orthotics for left sole of foot pain.  4.  Naproxen 220 to 440 mg up to twice daily as needed foot, thumb, and toe pain unresponsive to Humira.  5. Sars-CoV2 antibody test    Follow-up: Return in about 3 months    HPI:   Jermaine Acosta has a history of seronegative spondyloarthropathy; he presents for follow up. He was last seen in 1-2020, at which time seronegative spondyloarthropathy was in remission with near complete symptom control since starting adalimumab in 05/18.  However, he had experienced recurrent symptoms when attempting to reduce frequency of adalimumab injections to an every 3-week interval..  Plan was to resume use of adalimumab every 14 to 18 days.    Interval history 07-:    He notes modest increase in msk symptoms. He notes dosing cycle-dependent symtpoms, around days 7-8 after injection. He has pain at medial heel on L; also notes pain L foot 3rd MTP \"like a rock under the ball\" under the foot. L thumb base is also \"hotspot\". Just in past several months, notes \"coccyx\" pain, very sharp, worse after he rises from a sitting position. Location above the rectum, but not outside. Pain temporary and sharp. Not present in the morning. No early morning stiffness.    He denies skin or nail changes, fevers, chills, sweats, " cough, shortness of breath, or eye changes. He exercises vigorously multiple times per week without any limitations. No urinary symptoms.    Prior history:    1-2020:  Today, the patient reports that he decreased Humira frequency to every 3 weeks but in 03/2019 had a month of recurrent symptoms and painful injections. After about a month these improved and his Humira Rep felt that he may have gotten a bad dose. In the last couple of days before dosing, he does notice increased left wrist and thumb and left foot pain. He typically repeats Humira when he begins to notice symptoms. He is no longer using diclofenac.     He denies skin or nail changes, fevers, chills, sweats, cough, shortness of breath, or eye changes. He exercises vigorously multiple times per week without any limitations.     Review of Systems:   Pertinent items are noted in HPI or as below, remainder of complete ROS is negative.      No recent problems with hearing or vision. No swallowing problems.   No breathing difficulty, shortness of breath, coughing, or wheezing  No chest pain or palpitations  No heart burn, indigestion, abdominal pain, nausea, vomiting, diarrhea  No urination problems, no bloody, cloudy urine, no dysuria  No numbing, tingling, weakness  No headaches or confusion  No rashes. No easy bleeding or bruising.     Active Medications:     Current Outpatient Medications:      adalimumab (HUMIRA *CF*) 40 MG/0.4ML pen kit, Inject 0.4 mLs (40 mg) Subcutaneous every 14 days Hold for signs of infection, then seek medical attention., Disp: 1 each, Rfl: 5     finasteride (PROSCAR) 5 MG tablet, , Disp: , Rfl: 0      Allergies:   Penicillins      Past Medical History:  Seronegative arthritis - treated with sulfasalazine and oral diclofenac, see note of May 2018     Past Surgical History:  Right ankle fracture; 2012  [place] teeth removal     Family History:   Father - rheumatoid arthritis   GF and mother: cancer     Social History:   No smoking  or tobacco use  Moderate alcohol use, 3x/week  Works as  at [employer]     Physical Exam:   There were no vitals taken for this visit.   Wt Readings from Last 4 Encounters:   01/29/20 88.8 kg (195 lb 12.8 oz)   08/16/19 87 kg (191 lb 12.8 oz)   02/01/19 89.6 kg (197 lb 8 oz)   08/02/18 87.9 kg (193 lb 11.2 oz)     Constitutional: Well-developed, appearing stated age; cooperative  Eyes: Normal EOM, PERRLA, vision, conjunctiva, sclera  ENT: Normal external ears, nose, hearing, lips, teeth, gums, throat. No mucous membrane lesions, normal saliva pool  Neck: No mass or thyroid enlargement  MS: He points to the left first CMC joint as a point of tenderness and pain with repetitive use.  He points to the inferior aspect of the left foot sole approximately 4 cm anterior to the Achilles insertion at the posterior terminus of the longitudinal arch.  Right wrist lacks 20 degrees of extension.  Otherwise, excellent fist formation, and full range of motion in elbows shoulders ankles and feet.  Skin: No nail pitting, alopecia, rash, nodules or lesions  Neuro: Normal cranial nerves  Psych: Normal judgement, orientation, memory, affect.     Laboratory:   RHEUM RESULTS Latest Ref Rng & Units 2/27/2018 5/24/2018 8/16/2019   SED RATE 0 - 15 mm/h 7 - -   CRP, INFLAMMATION 0.0 - 8.0 mg/L 6.0 - -   CK TOTAL 30 - 300 U/L 214 - -   RHEUMATOID FACTOR <20 IU/mL <20 - -   AST 0 - 45 U/L 28 - 30   ALT 0 - 70 U/L 46 - 40   ALBUMIN 3.4 - 5.0 g/dL 4.3 - -   WBC 4.0 - 11.0 10e9/L 6.3 - 4.2   RBC 4.4 - 5.9 10e12/L 4.63 - 4.80   HGB 13.3 - 17.7 g/dL 15.2 - 15.9   HCT 40.0 - 53.0 % 45.4 - 46.9   MCV 78 - 100 fl 98 - 98   MCHC 31.5 - 36.5 g/dL 33.5 - 33.9   RDW 10.0 - 15.0 % 11.4 - 11.5    - 450 10e9/L 187 - 190   CREATININE 0.66 - 1.25 mg/dL 0.90 - 0.96   GFR ESTIMATE, IF BLACK >60 mL/min/[1.73:m2] >90 - >90   GFR ESTIMATE >60 mL/min/[1.73:m2] >90 - >90   HEPATITIS C ANTIBODY NR:Nonreactive - Nonreactive -       Rheumatoid  Factor   Date Value Ref Range Status   02/27/2018 <20 <20 IU/mL Final     Cyclic Citrullinated Peptide Antibody, IgG   Date Value Ref Range Status   02/27/2018 1 <7 U/mL Final     Comment:     Negative     Hep B Surface Agn   Date Value Ref Range Status   05/24/2018 Nonreactive NR^Nonreactive Final     Patient stated he already went through E-check in on my chart - and was unable to speak with writer at the time of the call.         Jermaine Acosta is a 30 year old male who is being evaluated via a billable video visit.        Video-Visit Details    Type of service:  Video Visit    Video Start Time: 2:11 PM  Video End Time: 2:40 PM    Originating Location (pt. Location): Home    Distant Location (provider location):  [facility] RHEUMATOLOGY     Platform used for Video Visit: Lyndon Calderon MD

## 2020-07-28 NOTE — PATIENT INSTRUCTIONS
Diagnosis:  1.  Seronegative spondyloarthropathy/psoriatic arthritis: Toe swelling, heel pain, base of thumb pain occurring in a Humira dosing cycle dependent fashion may reflect flare of underlying inflammatory disorder versus gradual loss of Humira efficacy.  2.  Febrile illness, now resolved at the outset of the coronavirus epidemic    Plan:  1.  Check inflammatory markers, blood counts and kidney function, coronavirus antibodies.  2.  Continue Humira 40 mg subcu every other week.  3.  Consider replacing running shoes, trial of Tuli heel inserts, trial of off-the-shelf orthotics for left sole of foot pain.  4.  Naproxen 220 to 440 mg up to twice daily as needed foot, thumb, and toe pain unresponsive to Humira.

## 2020-07-29 DIAGNOSIS — M47.819 SERONEGATIVE SPONDYLOARTHROPATHY: ICD-10-CM

## 2020-07-29 LAB
ALT SERPL W P-5'-P-CCNC: 83 U/L (ref 0–70)
AST SERPL W P-5'-P-CCNC: 52 U/L (ref 0–45)
CREAT SERPL-MCNC: 0.96 MG/DL (ref 0.66–1.25)
CRP SERPL-MCNC: 6.8 MG/L (ref 0–8)
ERYTHROCYTE [SEDIMENTATION RATE] IN BLOOD BY WESTERGREN METHOD: 8 MM/H (ref 0–15)
GFR SERPL CREATININE-BSD FRML MDRD: >90 ML/MIN/{1.73_M2}

## 2020-07-30 RX ORDER — ADALIMUMAB 40MG/0.4ML
KIT SUBCUTANEOUS
Qty: 1 EACH | Refills: 5 | Status: SHIPPED | OUTPATIENT
Start: 2020-07-30 | End: 2021-03-02

## 2020-08-02 LAB
COVID-19 SPIKE RBD ABY TITER: NORMAL
COVID-19 SPIKE RBD ABY: NEGATIVE

## 2020-08-05 ENCOUNTER — TELEPHONE (OUTPATIENT)
Dept: RHEUMATOLOGY | Facility: CLINIC | Age: 30
End: 2020-08-05

## 2020-08-05 DIAGNOSIS — M47.819 SERONEGATIVE SPONDYLOARTHROPATHY: Primary | ICD-10-CM

## 2020-08-05 DIAGNOSIS — M13.0 POLYARTHRITIS: ICD-10-CM

## 2020-08-05 NOTE — TELEPHONE ENCOUNTER
Spoke to Raul to let him know that below message from Dr. Calderon below:  He is going to call to schedule the appointment to have those tests repeated:    Liver function tests are slightly off. Reason is unclear. I recommend repeating AST and ALT in 1 month.       Rafaela Sebastian MSN, RN  Rheumatology RN Care Coordinator  [facility]

## 2020-09-15 DIAGNOSIS — M47.819 SERONEGATIVE SPONDYLOARTHROPATHY: ICD-10-CM

## 2020-09-15 DIAGNOSIS — M53.3 CHRONIC COCCYGEAL PAIN: ICD-10-CM

## 2020-09-15 DIAGNOSIS — M13.0 POLYARTHRITIS: ICD-10-CM

## 2020-09-15 DIAGNOSIS — G89.29 CHRONIC COCCYGEAL PAIN: ICD-10-CM

## 2020-09-15 LAB
ALBUMIN UR-MCNC: NEGATIVE MG/DL
ALT SERPL W P-5'-P-CCNC: 79 U/L (ref 0–70)
APPEARANCE UR: CLEAR
AST SERPL W P-5'-P-CCNC: 43 U/L (ref 0–45)
BILIRUB UR QL STRIP: NEGATIVE
COLOR UR AUTO: YELLOW
GLUCOSE UR STRIP-MCNC: NEGATIVE MG/DL
HGB UR QL STRIP: NEGATIVE
KETONES UR STRIP-MCNC: NEGATIVE MG/DL
LEUKOCYTE ESTERASE UR QL STRIP: NEGATIVE
NITRATE UR QL: NEGATIVE
PH UR STRIP: 7 PH (ref 5–7)
RBC #/AREA URNS AUTO: 0 /HPF (ref 0–2)
SOURCE: NORMAL
SP GR UR STRIP: 1.01 (ref 1–1.03)
UROBILINOGEN UR STRIP-MCNC: 2 MG/DL (ref 0–2)
WBC #/AREA URNS AUTO: 1 /HPF (ref 0–5)

## 2020-11-12 ENCOUNTER — MYC MEDICAL ADVICE (OUTPATIENT)
Dept: RHEUMATOLOGY | Facility: CLINIC | Age: 30
End: 2020-11-12

## 2020-11-13 NOTE — TELEPHONE ENCOUNTER
Left message for Raul to return my call, but will also send mychart as well.     Rafaela Sebastian MSN, RN  Rheumatology RN Care Coordinator   [facility]

## 2020-11-16 NOTE — PROGRESS NOTES
[facility]  Rheumatology Clinic-remote  Tay Calderon MD  2020     Name: Jermaine Acosta  MRN: [id number]  Age: 29 year old  : 1990  Referring provider: Pat Bell     Assessment and Plan:  # Seronegative spondyloarthropathy:  Patient relates Humira dosing cycle dependent symptoms continuing over the past 6 months, as well as Right 3rd finger and R great  toe discomfort and swelling.  Video examination today shows swelling and reduced ROM of the R 3rd MCP. Laboratory evaluation on September 15 showed clear urinalysis; ALT was elevated at 79, stable compared with a value of 83 recorded on .  AST was normal.  Creatinine was normal on .    Waxing and waning pain in right third MCP and right midfoot represent flaring inflammatory arthritis, despite continued use of adalimumab.  Symptoms have persisted for many months, with migratory additive arthritis and enthesitis, and I think that adalimumab should be judged no longer effective.  I recommend discontinuing it and making a trial of anti-IL 17 therapy.    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  The presence of antibodies could allow him to plan for safe participation in teaching responsibilities.     # Coccygeal pain: The transient, positional nature of the pain argues against causal association with systemic rheumatic disease. Pain resolved.    We discussed the following plan in detail:    Plan:  1.  Check AST, ALT, CBC, creatinine, and CRP in the next 2 weeks.  2.  Stop adalimumab.  3.  Start Cosentyx 150 mg subcutaneously once a week for 4 weeks.  Then use 150 mg subcutaneously monthly.  4.  Expect benefit from Cosentyx to begin within several weeks of starting injections.  Maximum benefit may be expected at 4 to 8 weeks.  5.  Do not start  "Cosentyx until at least 14 days after the last dose of adalimumab.  6.  Aleve 440 mg up to twice daily as needed for joint pain while ramping up Cosentyx.    Follow-up: Return in about 10 weeks    HPI:   Jermaine Acosta has a history of seronegative spondyloarthropathy; he presents for follow up.   He was last seen on July 28, 2020, when accumulating lower extremity joint pain symptoms were noted.  Plan was to institute a trial of nonpharmacologic measures for left foot and left thumb pain, and to institute nonsteroidal supplement to chronic Humira therapy for seronegative spondyloarthropathy.    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  Patient had dramatic benefit with use of Humira from 2018 through 2020.  Humira lost efficacy in the summer 2020.  Cosentyx was started in November 2020.      Interval history 11-:    Still noting dosing cycle-dependent symptoms; he gets swelling 7 days after humira, in R 3rd MC, R midfoot. Base of L thumb is also involved. +difficult fist formation on the R. Painful walking on R foot. Morning stiffness is not prominent outside \"hot spots\".     Review of Systems:   Pertinent items are noted in HPI or as below, remainder of complete ROS is negative.      No recent problems with hearing or vision. No swallowing problems.   No breathing difficulty, shortness of breath, coughing, or wheezing  No chest pain or palpitations  No heart burn, indigestion, abdominal pain, nausea, vomiting, diarrhea  No urination problems, no bloody, cloudy urine, no dysuria  No numbing, tingling, weakness  No headaches or confusion  No rashes. No easy bleeding or bruising.     Active Medications:     Current Outpatient Medications:      finasteride (PROSCAR) 5 MG tablet, , Disp: , Rfl: 0     HUMIRA *CF* PEN 40 MG/0.4ML pen kit, INJECT THE CONTENTS OF 1 AUTOINJECTOR PEN UNDER THE SKIN EVERY OTHER WEEK HOLD FOR SIGNS OF INFECTION, " THEN SEEK MEDICAL ATTENTION, Disp: 1 each, Rfl: 5      Allergies:   Penicillins      Past Medical History:  Seronegative arthritis - treated with sulfasalazine and oral diclofenac, see note of May 2018     Past Surgical History:  Right ankle fracture; 2012  [place] teeth removal     Family History:   Father - rheumatoid arthritis   GF and mother: cancer     Social History:   No smoking or tobacco use  Moderate alcohol use, 3x/week  Works as  at [employer]     Physical Exam:   There were no vitals taken for this visit.   Wt Readings from Last 4 Encounters:   01/29/20 88.8 kg (195 lb 12.8 oz)   08/16/19 87 kg (191 lb 12.8 oz)   02/01/19 89.6 kg (197 lb 8 oz)   08/02/18 87.9 kg (193 lb 11.2 oz)     Constitutional: Well-developed, appearing stated age; cooperative  Eyes: Normal EOM, PERRLA, vision, conjunctiva, sclera  ENT: Normal external ears, nose, hearing, lips, teeth, gums, throat. No mucous membrane lesions, normal saliva pool  Neck: No mass or thyroid enlargement  MS: He points to the left first CMC joint as a point of tenderness and pain with repetitive use.  R 3rd knuckle shows reduced flexion/extension. He points to the R 3rd toe base as a pain source.  Right wrist lacks 20 degrees of extension.  Otherwise, excellent fist formation, and full range of motion in elbows shoulders ankles and feet.  Skin: No nail pitting, alopecia, rash, nodules or lesions  Neuro: Normal cranial nerves  Psych: Normal judgement, orientation, memory, affect.     Laboratory:   RHEUM RESULTS Latest Ref Rng & Units 8/16/2019 7/29/2020 9/15/2020   SED RATE 0 - 15 mm/h - 8 -   CRP, INFLAMMATION 0.0 - 8.0 mg/L - 6.8 -   CK TOTAL 30 - 300 U/L - - -   RHEUMATOID FACTOR <20 IU/mL - - -   AST 0 - 45 U/L 30 52(H) 43   ALT 0 - 70 U/L 40 83(H) 79(H)   ALBUMIN 3.4 - 5.0 g/dL - - -   WBC 4.0 - 11.0 10e9/L 4.2 - -   RBC 4.4 - 5.9 10e12/L 4.80 - -   HGB 13.3 - 17.7 g/dL 15.9 - -   HCT 40.0 - 53.0 % 46.9 - -   MCV 78 - 100 fl 98 - -    MCHC 31.5 - 36.5 g/dL 33.9 - -   RDW 10.0 - 15.0 % 11.5 - -    - 450 10e9/L 190 - -   CREATININE 0.66 - 1.25 mg/dL 0.96 0.96 -   GFR ESTIMATE, IF BLACK >60 mL/min/[1.73:m2] >90 >90 -   GFR ESTIMATE >60 mL/min/[1.73:m2] >90 >90 -   HEPATITIS C ANTIBODY NR:Nonreactive - - -       Rheumatoid Factor   Date Value Ref Range Status   02/27/2018 <20 <20 IU/mL Final     Cyclic Citrullinated Peptide Antibody, IgG   Date Value Ref Range Status   02/27/2018 1 <7 U/mL Final     Comment:     Negative     Hep B Surface Agn   Date Value Ref Range Status   05/24/2018 Nonreactive NR^Nonreactive Final

## 2020-11-17 ENCOUNTER — VIRTUAL VISIT (OUTPATIENT)
Dept: RHEUMATOLOGY | Facility: CLINIC | Age: 30
End: 2020-11-17
Attending: INTERNAL MEDICINE
Payer: COMMERCIAL

## 2020-11-17 DIAGNOSIS — L40.50 PSORIATIC ARTHRITIS (H): Primary | ICD-10-CM

## 2020-11-17 DIAGNOSIS — M47.819 SERONEGATIVE SPONDYLOARTHROPATHY: ICD-10-CM

## 2020-11-17 PROCEDURE — 99214 OFFICE O/P EST MOD 30 MIN: CPT | Mod: 95 | Performed by: INTERNAL MEDICINE

## 2020-11-17 RX ORDER — SECUKINUMAB 150 MG/ML
150 INJECTION SUBCUTANEOUS WEEKLY
Qty: 5 EACH | Refills: 0 | Status: SHIPPED | OUTPATIENT
Start: 2020-11-17 | End: 2021-03-02

## 2020-11-17 RX ORDER — SECUKINUMAB 150 MG/ML
150 INJECTION SUBCUTANEOUS
Qty: 1 EACH | Refills: 5 | Status: SHIPPED | OUTPATIENT
Start: 2020-11-17 | End: 2022-08-10

## 2020-11-17 NOTE — PATIENT INSTRUCTIONS
Diagnosis:  1.  Seronegative spondyloarthropathy/psoriatic arthritis: Waxing and waning pain in right third MCP and right midfoot represent flaring inflammatory arthritis, despite continued use of adalimumab.  Symptoms have persisted for many months, and I think that adalimumab should be judged no longer effective.  I recommend discontinuing it and making a trial of anti-IL 17 therapy.    Plan:  1.  Check AST, ALT, CBC, creatinine, and CRP in the next 2 weeks.  2.  Stop adalimumab.  3.  Start Cosentyx 150 mg subcutaneously once a week for 4 weeks.  Then use 150 mg subcutaneously monthly.  4.  Expect benefit from Cosentyx to begin within several weeks of starting injections.  Maximum benefit may be expected at 4 to 8 weeks.  5.  Do not start Cosentyx until at least 14 days after the last dose of adalimumab.  6.  Aleve 440 mg up to twice daily as needed for joint pain while ramping up Cosentyx.

## 2020-11-17 NOTE — PROGRESS NOTES
"Jermaine Acosta is a 30 year old male who is being evaluated via a billable video visit.      The patient has been notified of following:     \"This video visit will be conducted via a call between you and your physician/provider. We have found that certain health care needs can be provided without the need for an in-person physical exam.  This service lets us provide the care you need with a video conversation.  If a prescription is necessary we can send it directly to your pharmacy.  If lab work is needed we can place an order for that and you can then stop by our lab to have the test done at a later time.    Video visits are billed at different rates depending on your insurance coverage.  Please reach out to your insurance provider with any questions.    If during the course of the call the physician/provider feels a video visit is not appropriate, you will not be charged for this service.\"    Patient has given verbal consent for Video visit? Yes  How would you like to obtain your AVS? MyChart  If you are dropped from the video visit, the video invite should be resent to: Text to cell phone: home  Will anyone else be joining your video visit? No        Video-Visit Details    Type of service:  Video Visit    Video Start Time: 0810 PM  Video End Time: 0840 PM    Originating Location (pt. Location): Home    Distant Location (provider location):  [facility] RHEUMATOLOGY CLINIC [place]     Platform used for Video Visit: Lyndon Calderon MD        "

## 2020-11-17 NOTE — LETTER
2020       RE: Jermaine Acosta  [address] [address] [facility] [address]     Dear Colleague,    Thank you for referring your patient, Jermaine Acosta, to the [facility] RHEUMATOLOGY CLINIC [place] at [facility]. Please see a copy of my visit note below.    [facility]  Rheumatology Clinic-remote  Tay Calderon MD  2020     Name: Jermaine Acosta  MRN: [id number]  Age: 29 year old  : 1990  Referring provider: Pat Bell     Assessment and Plan:  # Seronegative spondyloarthropathy:  Patient relates Humira dosing cycle dependent symptoms continuing over the past 6 months, as well as Right 3rd finger and R great  toe discomfort and swelling.  Video examination today shows swelling and reduced ROM of the R 3rd MCP. Laboratory evaluation on September 15 showed clear urinalysis; ALT was elevated at 79, stable compared with a value of 83 recorded on .  AST was normal.  Creatinine was normal on .    Waxing and waning pain in right third MCP and right midfoot represent flaring inflammatory arthritis, despite continued use of adalimumab.  Symptoms have persisted for many months, with migratory additive arthritis and enthesitis, and I think that adalimumab should be judged no longer effective.  I recommend discontinuing it and making a trial of anti-IL 17 therapy.    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  The presence of antibodies could allow him to plan for safe participation in teaching responsibilities.     # Coccygeal pain: The transient, positional nature of the pain argues against causal association with systemic rheumatic disease. Pain resolved.    We discussed the following plan in detail:    Plan:  1.  Check AST, ALT, CBC, creatinine, and CRP  "in the next 2 weeks.  2.  Stop adalimumab.  3.  Start Cosentyx 150 mg subcutaneously once a week for 4 weeks.  Then use 150 mg subcutaneously monthly.  4.  Expect benefit from Cosentyx to begin within several weeks of starting injections.  Maximum benefit may be expected at 4 to 8 weeks.  5.  Do not start Cosentyx until at least 14 days after the last dose of adalimumab.  6.  Aleve 440 mg up to twice daily as needed for joint pain while ramping up Cosentyx.    Follow-up: Return in about 10 weeks    HPI:   Jermaine Acosta has a history of seronegative spondyloarthropathy; he presents for follow up.   He was last seen on July 28, 2020, when accumulating lower extremity joint pain symptoms were noted.  Plan was to institute a trial of nonpharmacologic measures for left foot and left thumb pain, and to institute nonsteroidal supplement to chronic Humira therapy for seronegative spondyloarthropathy.    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  Patient had dramatic benefit with use of Humira from 2018 through 2020.  Humira lost efficacy in the summer 2020.  Cosentyx was started in November 2020.      Interval history 11-:    Still noting dosing cycle-dependent symptoms; he gets swelling 7 days after humira, in R 3rd MC, R midfoot. Base of L thumb is also involved. +difficult fist formation on the R. Painful walking on R foot. Morning stiffness is not prominent outside \"hot spots\".     Review of Systems:   Pertinent items are noted in HPI or as below, remainder of complete ROS is negative.      No recent problems with hearing or vision. No swallowing problems.   No breathing difficulty, shortness of breath, coughing, or wheezing  No chest pain or palpitations  No heart burn, indigestion, abdominal pain, nausea, vomiting, diarrhea  No urination problems, no bloody, cloudy urine, no dysuria  No numbing, tingling, weakness  No headaches or " confusion  No rashes. No easy bleeding or bruising.     Active Medications:     Current Outpatient Medications:      finasteride (PROSCAR) 5 MG tablet, , Disp: , Rfl: 0     HUMIRA *CF* PEN 40 MG/0.4ML pen kit, INJECT THE CONTENTS OF 1 AUTOINJECTOR PEN UNDER THE SKIN EVERY OTHER WEEK HOLD FOR SIGNS OF INFECTION, THEN SEEK MEDICAL ATTENTION, Disp: 1 each, Rfl: 5      Allergies:   Penicillins      Past Medical History:  Seronegative arthritis - treated with sulfasalazine and oral diclofenac, see note of May 2018     Past Surgical History:  Right ankle fracture; 2012  [place] teeth removal     Family History:   Father - rheumatoid arthritis   GF and mother: cancer     Social History:   No smoking or tobacco use  Moderate alcohol use, 3x/week  Works as  at [employer]     Physical Exam:   There were no vitals taken for this visit.   Wt Readings from Last 4 Encounters:   01/29/20 88.8 kg (195 lb 12.8 oz)   08/16/19 87 kg (191 lb 12.8 oz)   02/01/19 89.6 kg (197 lb 8 oz)   08/02/18 87.9 kg (193 lb 11.2 oz)     Constitutional: Well-developed, appearing stated age; cooperative  Eyes: Normal EOM, PERRLA, vision, conjunctiva, sclera  ENT: Normal external ears, nose, hearing, lips, teeth, gums, throat. No mucous membrane lesions, normal saliva pool  Neck: No mass or thyroid enlargement  MS: He points to the left first CMC joint as a point of tenderness and pain with repetitive use.  R 3rd knuckle shows reduced flexion/extension. He points to the R 3rd toe base as a pain source.  Right wrist lacks 20 degrees of extension.  Otherwise, excellent fist formation, and full range of motion in elbows shoulders ankles and feet.  Skin: No nail pitting, alopecia, rash, nodules or lesions  Neuro: Normal cranial nerves  Psych: Normal judgement, orientation, memory, affect.     Laboratory:   RHEUM RESULTS Latest Ref Rng & Units 8/16/2019 7/29/2020 9/15/2020   SED RATE 0 - 15 mm/h - 8 -   CRP, INFLAMMATION 0.0 - 8.0 mg/L - 6.8 -  "  CK TOTAL 30 - 300 U/L - - -   RHEUMATOID FACTOR <20 IU/mL - - -   AST 0 - 45 U/L 30 52(H) 43   ALT 0 - 70 U/L 40 83(H) 79(H)   ALBUMIN 3.4 - 5.0 g/dL - - -   WBC 4.0 - 11.0 10e9/L 4.2 - -   RBC 4.4 - 5.9 10e12/L 4.80 - -   HGB 13.3 - 17.7 g/dL 15.9 - -   HCT 40.0 - 53.0 % 46.9 - -   MCV 78 - 100 fl 98 - -   MCHC 31.5 - 36.5 g/dL 33.9 - -   RDW 10.0 - 15.0 % 11.5 - -    - 450 10e9/L 190 - -   CREATININE 0.66 - 1.25 mg/dL 0.96 0.96 -   GFR ESTIMATE, IF BLACK >60 mL/min/[1.73:m2] >90 >90 -   GFR ESTIMATE >60 mL/min/[1.73:m2] >90 >90 -   HEPATITIS C ANTIBODY NR:Nonreactive - - -       Rheumatoid Factor   Date Value Ref Range Status   02/27/2018 <20 <20 IU/mL Final     Cyclic Citrullinated Peptide Antibody, IgG   Date Value Ref Range Status   02/27/2018 1 <7 U/mL Final     Comment:     Negative     Hep B Surface Agn   Date Value Ref Range Status   05/24/2018 Nonreactive NR^Nonreactive Final           Jermaine Acosta is a 30 year old male who is being evaluated via a billable video visit.      The patient has been notified of following:     \"This video visit will be conducted via a call between you and your physician/provider. We have found that certain health care needs can be provided without the need for an in-person physical exam.  This service lets us provide the care you need with a video conversation.  If a prescription is necessary we can send it directly to your pharmacy.  If lab work is needed we can place an order for that and you can then stop by our lab to have the test done at a later time.    Video visits are billed at different rates depending on your insurance coverage.  Please reach out to your insurance provider with any questions.    If during the course of the call the physician/provider feels a video visit is not appropriate, you will not be charged for this service.\"    Patient has given verbal consent for Video visit? Yes  How would you like to obtain your AVS? MyChart  If you are dropped " from the video visit, the video invite should be resent to: Text to cell phone: home  Will anyone else be joining your video visit? No        Video-Visit Details    Type of service:  Video Visit    Video Start Time: 0810 PM  Video End Time: 0840 PM    Originating Location (pt. Location): Home    Distant Location (provider location):  [facility] RHEUMATOLOGY CLINIC [place]     Platform used for Video Visit: Lyndon Calderon MD

## 2020-11-18 ENCOUNTER — TELEPHONE (OUTPATIENT)
Dept: RHEUMATOLOGY | Facility: CLINIC | Age: 30
End: 2020-11-18

## 2020-11-18 NOTE — TELEPHONE ENCOUNTER
PA Initiation    Medication: COSENTYX   Insurance Company: [employer] [place] - Phone [phone] Fax [phone]  Pharmacy Filling the Rx: [facility] - [place], [place] - [facility] [address]  Filling Pharmacy Phone:    Filling Pharmacy Fax:    Start Date: 11/18/2020    ISRAEL CARTER ([place]: [id number])

## 2020-11-24 DIAGNOSIS — M47.819 SERONEGATIVE SPONDYLOARTHROPATHY: ICD-10-CM

## 2020-11-24 LAB
ALBUMIN SERPL-MCNC: 4.5 G/DL (ref 3.4–5)
ALT SERPL W P-5'-P-CCNC: 42 U/L (ref 0–70)
AST SERPL W P-5'-P-CCNC: 38 U/L (ref 0–45)
CREAT SERPL-MCNC: 1.04 MG/DL (ref 0.66–1.25)
CRP SERPL-MCNC: 3.3 MG/L (ref 0–8)
ERYTHROCYTE [DISTWIDTH] IN BLOOD BY AUTOMATED COUNT: 11.1 % (ref 10–15)
GFR SERPL CREATININE-BSD FRML MDRD: >90 ML/MIN/{1.73_M2}
HCT VFR BLD AUTO: 44.7 % (ref 40–53)
HGB BLD-MCNC: 15.3 G/DL (ref 13.3–17.7)
MCH RBC QN AUTO: 32.6 PG (ref 26.5–33)
MCHC RBC AUTO-ENTMCNC: 34.2 G/DL (ref 31.5–36.5)
MCV RBC AUTO: 95 FL (ref 78–100)
PLATELET # BLD AUTO: 231 10E9/L (ref 150–450)
RBC # BLD AUTO: 4.7 10E12/L (ref 4.4–5.9)
WBC # BLD AUTO: 5.8 10E9/L (ref 4–11)

## 2020-11-24 PROCEDURE — 85027 COMPLETE CBC AUTOMATED: CPT | Performed by: PATHOLOGY

## 2020-11-24 PROCEDURE — 84450 TRANSFERASE (AST) (SGOT): CPT | Performed by: PATHOLOGY

## 2020-11-24 PROCEDURE — 36415 COLL VENOUS BLD VENIPUNCTURE: CPT | Performed by: PATHOLOGY

## 2020-11-24 PROCEDURE — 84460 ALANINE AMINO (ALT) (SGPT): CPT | Performed by: PATHOLOGY

## 2020-11-24 PROCEDURE — 82565 ASSAY OF CREATININE: CPT | Performed by: PATHOLOGY

## 2020-11-24 PROCEDURE — 86140 C-REACTIVE PROTEIN: CPT | Performed by: PATHOLOGY

## 2020-11-24 PROCEDURE — 82040 ASSAY OF SERUM ALBUMIN: CPT | Performed by: PATHOLOGY

## 2020-11-25 NOTE — TELEPHONE ENCOUNTER
Left message for Raul to return my call but will also send him a mychart message as well.     Rafaela Sebastian MSN, RN  Rheumatology RN Care Coordinator  [facility]

## 2020-12-04 ENCOUNTER — TELEPHONE (OUTPATIENT)
Dept: RHEUMATOLOGY | Facility: CLINIC | Age: 30
End: 2020-12-04

## 2020-12-04 DIAGNOSIS — M47.819 SERONEGATIVE SPONDYLOARTHROPATHY: Primary | ICD-10-CM

## 2020-12-04 NOTE — TELEPHONE ENCOUNTER
Spoke to Raul and he states that at his last visit with Dr. 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    Will place the order.     Rafaela Sebastian MSN, RN  Rheumatology RN Care Coordinator   [facility]

## 2020-12-04 NOTE — TELEPHONE ENCOUNTER
[phone]    Reference [id number]      Called provider services, spoke to Jonathanjammie - she stated that she is unable to tell what the qty limit is of the plan.  Submitted an appeal form to insurance and faxed to number provided above

## 2021-01-03 ENCOUNTER — HEALTH MAINTENANCE LETTER (OUTPATIENT)
Age: 31
End: 2021-01-03

## 2021-01-04 NOTE — TELEPHONE ENCOUNTER
If we do not understand the reason for the denial, I recommend appealing again. Change medication only if we are unable to respond to denial specifics.

## 2021-01-04 NOTE — TELEPHONE ENCOUNTER
Appeal Denied  Denial Reason(s)  - INSURANCE STATES THAT THEY WILL NOT COVER INDUCTION DOSING   Patient notified? YES   Additional Information - CAN APPEAL, INFORMATION IN LETTER PER PATIENT'S MYCHART LETTER

## 2021-01-05 NOTE — TELEPHONE ENCOUNTER
If loading dose is not covered, I approve of the patient starting out with Cosentyx without a loading schedule.

## 2021-01-05 NOTE — TELEPHONE ENCOUNTER
Filled out Cosentyx Connect form to obtain loading dose as it is not covered by insurance - faxed to program and updated patient

## 2021-01-13 NOTE — TELEPHONE ENCOUNTER
Called Felipa to check status of enrollment - Raul's enrollment should be finished within 24-48 hours

## 2021-01-14 ENCOUNTER — MYC MEDICAL ADVICE (OUTPATIENT)
Dept: RHEUMATOLOGY | Facility: CLINIC | Age: 31
End: 2021-01-14

## 2021-01-14 NOTE — TELEPHONE ENCOUNTER
Patient successfully enrolled in Cosentyx program- patient will receive his initial 5 loading doses free of charge from the .  Sending patient mycart with next steps.

## 2021-01-18 NOTE — TELEPHONE ENCOUNTER
Spoke with Raul and reschedule his appointment to 3/2/2021 at 3pm for onsite.  His 1/26 will be cancelled.     Sent to the schedulers to cancel and reschedule.    Rafaela Sebastian MSN, RN  Rheumatology RN Care Coordinator   [facility]

## 2021-02-12 ENCOUNTER — TELEPHONE (OUTPATIENT)
Dept: RHEUMATOLOGY | Facility: CLINIC | Age: 31
End: 2021-02-12

## 2021-02-12 NOTE — TELEPHONE ENCOUNTER
Health Call Center    Phone Message    May a detailed message be left on voicemail: yes, [phone]    Reason for Call: Order(s): Other: Verify Orders that where transferred to the [facility] pharmacy  Date needed: 2/12/21  Provider name: Dr Calderon      Action Taken: Message routed to:  [facility] Adult Rheumatology    Travel Screening: Not Applicable

## 2021-03-01 NOTE — PROGRESS NOTES
[facility]  Rheumatology Clinic-in person  Tay Calderon MD  2021     Name: Jermaine Acosta  MRN: [id number]  Age: 30 year old  : 1990  Referring provider: Pat Bell     Assessment and Plan:  # Seronegative spondyloarthropathy:  Patient relates complete resolution of Humira dosing cycle-dependent symptoms involving the right third finger, left base of thumb, and right forefoot that afflicted him through the 2020.  Joint exam is benign today.  Laboratory evaluation on 2020 showed creatinine, transaminases, albumin, CRP, and CBC all normal or negative.    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  I recommend continuing anti-IL 17 therapy.  He has completed 4 of 5 doses of secukinumab recommended for loading schedule.    # Febrile illness, 2021: Patient had a febrile illness associated with gastrointestinal symptoms after returning from a trip to [place] in recent weeks.  Symptoms have since resolved.  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    # Coccygeal pain: The transient, positional nature of the pain argues against causal association with systemic rheumatic disease. Pain resolved.    We discussed the following plan in detail:    Plan:  1.  Check CBC, LFTs, creatinine, and inflammatory marker today.  2.  Check IgG and IgM coronavirus antibodies  3.  Complete fifth loading dose of Cosentyx 150 mg, then continue to inject 150 mg every 30 days thereafter.    Follow-up: Return in about 6 mos    Tay Calderon MD  Staff Rheumatologist, [facility]      HPI:   Jermaine Acosta has a history of seronegative spondyloarthropathy; he presents for follow up.   He was last seen , when  "accumulating lower extremity joint pain symptoms were noted.  Humira was judged to be a failed therapeutic, and it was discontinued.  A trial of Cosentyx 150 mg was begun.    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  Patient had dramatic benefit with use of Humira from 2018 through 2020.  Humira lost efficacy in the summer 2020.  Cosentyx was started in January 2021.    Interval history 03-:    Within 48 hours of receiving the first dose of sarilumab in late January 2021, he experienced near complete resolution of pain, stiffness, and/or swelling in his right third MCP, right midfoot, and base of his left thumb.  Difficult fist formation on the right disappeared within the first week after starting sarilumab.  He denies injection site reactions, fevers chills or sweats.    He had an episode of abdominal pain, diarrhea, and fever 100.8 shortly after returning from a trip to [place] in the last month.  He requests consideration of coronavirus IgG and IgM measurement    Interval history 11-:    Still noting dosing cycle-dependent symptoms; he gets swelling 7 days after humira, in R 3rd MC, R midfoot. Base of L thumb is also involved. +difficult fist formation on the R. Painful walking on R foot. Morning stiffness is not prominent outside \"hot spots\".     Review of Systems:   Pertinent items are noted in HPI or as below, remainder of complete ROS is negative.      No recent problems with hearing or vision. No swallowing problems.   No breathing difficulty, shortness of breath, coughing, or wheezing  No chest pain or palpitations  No heart burn, indigestion, abdominal pain, nausea, vomiting, diarrhea  No urination problems, no bloody, cloudy urine, no dysuria  No numbing, tingling, weakness  No headaches or confusion  No rashes. No easy bleeding or bruising.     Active Medications:     Current Outpatient Medications:      finasteride " (PROSCAR) 5 MG tablet, , Disp: , Rfl: 0     secukinumab (COSENTYX SENSOREADY PEN) 150 MG/ML Sensoready pen, Inject 1 mL (150 mg) Subcutaneous once a week At weeks 0, 1, 2, 3, and 4 and every 4 weeks there after loading dose., Disp: 1 mL, Rfl: 3     secukinumab (COSENTYX SENSOREADY PEN) 150 MG/ML Sensoready pen, Inject 1 mL (150 mg) Subcutaneous every 28 days Hold for signs of infection, and seek medical attention. (Patient not taking: Reported on 3/2/2021), Disp: 1 each, Rfl: 5      Allergies:   Penicillins      Past Medical History:  Seronegative arthritis - treated with sulfasalazine and oral diclofenac, see note of May 2018     Past Surgical History:  Right ankle fracture; 2012  [place] teeth removal     Family History:   Father - rheumatoid arthritis   GF and mother: cancer     Social History:   No smoking or tobacco use  Moderate alcohol use, 3x/week  Works as  at [employer]     Physical Exam:   BP (!) 140/78 (BP Location: Right arm, Patient Position: Sitting, Cuff Size: Adult Large)   Pulse 91   Wt 85.4 kg (188 lb 4.8 oz)   SpO2 98%   BMI 27.61 kg/m     Wt Readings from Last 4 Encounters:   03/02/21 85.4 kg (188 lb 4.8 oz)   01/29/20 88.8 kg (195 lb 12.8 oz)   08/16/19 87 kg (191 lb 12.8 oz)   02/01/19 89.6 kg (197 lb 8 oz)     Constitutional: Well-developed, appearing stated age; cooperative  Eyes: Normal EOM, PERRLA, vision, conjunctiva, sclera  ENT: Normal external ears, nose, hearing, lips, teeth, gums, throat. No mucous membrane lesions, normal saliva pool  Neck: No mass or thyroid enlargement  MS: No tenderness at any MCPs, PIPs, or MTPs.  Full range of motion at MCPs, PIPs, and MTPs.  Right wrist lacks 20 degrees of extension.  Otherwise, excellent fist formation, and full range of motion in elbows shoulders ankles and feet.  Skin: No nail pitting, alopecia, rash, nodules or lesions other than punctate erythematous macule located over the left dorsal forefoot; scaling erythematous patch  between the second and first toes on the left.  Neuro: Normal cranial nerves  Psych: Normal judgement, orientation, memory, affect.     Laboratory:   RHEUM RESULTS Latest Ref Rng & Units 7/29/2020 9/15/2020 11/24/2020   SED RATE 0 - 15 mm/h 8 - -   CRP, INFLAMMATION 0.0 - 8.0 mg/L 6.8 - 3.3   CK TOTAL 30 - 300 U/L - - -   RHEUMATOID FACTOR <20 IU/mL - - -   AST 0 - 45 U/L 52(H) 43 38   ALT 0 - 70 U/L 83(H) 79(H) 42   ALBUMIN 3.4 - 5.0 g/dL - - 4.5   WBC 4.0 - 11.0 10e9/L - - 5.8   RBC 4.4 - 5.9 10e12/L - - 4.70   HGB 13.3 - 17.7 g/dL - - 15.3   HCT 40.0 - 53.0 % - - 44.7   MCV 78 - 100 fl - - 95   MCHC 31.5 - 36.5 g/dL - - 34.2   RDW 10.0 - 15.0 % - - 11.1    - 450 10e9/L - - 231   CREATININE 0.66 - 1.25 mg/dL 0.96 - 1.04   GFR ESTIMATE, IF BLACK >60 mL/min/[1.73:m2] >90 - >90   GFR ESTIMATE >60 mL/min/[1.73:m2] >90 - >90   HEPATITIS C ANTIBODY NR:Nonreactive - - -       Rheumatoid Factor   Date Value Ref Range Status   02/27/2018 <20 <20 IU/mL Final     Cyclic Citrullinated Peptide Antibody, IgG   Date Value Ref Range Status   02/27/2018 1 <7 U/mL Final     Comment:     Negative     Hep B Surface Agn   Date Value Ref Range Status   05/24/2018 Nonreactive NR^Nonreactive Final

## 2021-03-02 ENCOUNTER — OFFICE VISIT (OUTPATIENT)
Dept: RHEUMATOLOGY | Facility: CLINIC | Age: 31
End: 2021-03-02
Attending: INTERNAL MEDICINE
Payer: COMMERCIAL

## 2021-03-02 VITALS
DIASTOLIC BLOOD PRESSURE: 78 MMHG | WEIGHT: 188.3 LBS | HEART RATE: 91 BPM | BODY MASS INDEX: 27.61 KG/M2 | OXYGEN SATURATION: 98 % | SYSTOLIC BLOOD PRESSURE: 140 MMHG

## 2021-03-02 DIAGNOSIS — L40.50 PSORIATIC ARTHRITIS (H): ICD-10-CM

## 2021-03-02 LAB
ALBUMIN SERPL-MCNC: 4.7 G/DL (ref 3.4–5)
ALT SERPL W P-5'-P-CCNC: 53 U/L (ref 0–70)
AST SERPL W P-5'-P-CCNC: 40 U/L (ref 0–45)
CREAT SERPL-MCNC: 1.05 MG/DL (ref 0.66–1.25)
ERYTHROCYTE [DISTWIDTH] IN BLOOD BY AUTOMATED COUNT: 11.2 % (ref 10–15)
GFR SERPL CREATININE-BSD FRML MDRD: >90 ML/MIN/{1.73_M2}
HCT VFR BLD AUTO: 45.6 % (ref 40–53)
HGB BLD-MCNC: 15.4 G/DL (ref 13.3–17.7)
MCH RBC QN AUTO: 32.4 PG (ref 26.5–33)
MCHC RBC AUTO-ENTMCNC: 33.8 G/DL (ref 31.5–36.5)
MCV RBC AUTO: 96 FL (ref 78–100)
PLATELET # BLD AUTO: 196 10E9/L (ref 150–450)
RBC # BLD AUTO: 4.76 10E12/L (ref 4.4–5.9)
WBC # BLD AUTO: 7 10E9/L (ref 4–11)

## 2021-03-02 PROCEDURE — 36415 COLL VENOUS BLD VENIPUNCTURE: CPT | Performed by: PATHOLOGY

## 2021-03-02 PROCEDURE — 82565 ASSAY OF CREATININE: CPT | Performed by: PATHOLOGY

## 2021-03-02 PROCEDURE — 82040 ASSAY OF SERUM ALBUMIN: CPT | Performed by: PATHOLOGY

## 2021-03-02 PROCEDURE — 99214 OFFICE O/P EST MOD 30 MIN: CPT | Performed by: INTERNAL MEDICINE

## 2021-03-02 PROCEDURE — G0463 HOSPITAL OUTPT CLINIC VISIT: HCPCS | Mod: GT

## 2021-03-02 PROCEDURE — 85027 COMPLETE CBC AUTOMATED: CPT | Performed by: PATHOLOGY

## 2021-03-02 PROCEDURE — 86769 SARS-COV-2 COVID-19 ANTIBODY: CPT | Performed by: PATHOLOGY

## 2021-03-02 PROCEDURE — 84450 TRANSFERASE (AST) (SGOT): CPT | Performed by: PATHOLOGY

## 2021-03-02 PROCEDURE — 84460 ALANINE AMINO (ALT) (SGPT): CPT | Performed by: PATHOLOGY

## 2021-03-02 RX ORDER — SECUKINUMAB 150 MG/ML
150 INJECTION SUBCUTANEOUS WEEKLY
Qty: 1 ML | Refills: 3 | Status: SHIPPED | OUTPATIENT
Start: 2021-03-02 | End: 2021-08-19

## 2021-03-02 ASSESSMENT — PAIN SCALES - GENERAL: PAINLEVEL: NO PAIN (0)

## 2021-03-02 NOTE — PATIENT INSTRUCTIONS
Seronegative spondyloarthropathy: Inflammatory symptoms in hands and feet have markedly diminished since starting Cosentyx in late January.  I recommend continuing Cosentyx after completion of the loading schedule.    Plan:  1.  Check CBC, LFTs, creatinine, and inflammatory marker today.  2.  Check IgG and IgM coronavirus antibodies  3.  Complete fifth loading dose of Cosentyx 150 mg, then continue to inject 150 mg every 30 days thereafter.

## 2021-03-02 NOTE — NURSING NOTE
Chief Complaint   Patient presents with     RECHECK     follow up         BP (!) 131/92 (BP Location: Right arm, Patient Position: Sitting, Cuff Size: Adult Regular)   Pulse 91   Wt 85.4 kg (188 lb 4.8 oz)   SpO2 98%   BMI 27.61 kg/m        Dayday Melendez, EMT

## 2021-03-02 NOTE — LETTER
3/2/2021       RE: Jermaine Acosta  [address] [facility] [address]     Dear Colleague,    Thank you for referring your patient, Jermaine Acosta, to the [facility] [place] at [facility]. Please see a copy of my visit note below.    [facility]  Rheumatology Clinic-in person  Tay Calderon MD  2021     Name: Jermaine Acosta  MRN: [id number]  Age: 30 year old  : 1990  Referring provider: Pat Bell     Assessment and Plan:  # Seronegative spondyloarthropathy:  Patient relates complete resolution of Humira dosing cycle-dependent symptoms involving the right third finger, left base of thumb, and right forefoot that afflicted him through the 2020.  Joint exam is benign today.  Laboratory evaluation on 2020 showed creatinine, transaminases, albumin, CRP, and CBC all normal or negative.    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  I recommend continuing anti-IL 17 therapy.  He has completed 4 of 5 doses of secukinumab recommended for loading schedule.    # Febrile illness, 2021: Patient had a febrile illness associated with gastrointestinal symptoms after returning from a trip to [place] in recent weeks.  Symptoms have since resolved.  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    # Coccygeal pain: The transient, positional nature of the pain argues against causal association with systemic rheumatic disease. Pain resolved.    We discussed the following plan in detail:    Plan:  1.  Check CBC, LFTs, creatinine, and inflammatory marker today.  2.  Check IgG and IgM coronavirus antibodies  3.  Complete fifth loading  "dose of Cosentyx 150 mg, then continue to inject 150 mg every 30 days thereafter.    Follow-up: Return in about 6 mos    Tay Calderon MD  Staff Rheumatologist, [facility]      HPI:   Jermaine Acosta has a history of seronegative spondyloarthropathy; he presents for follow up.   He was last seen November, 2020, when accumulating lower extremity joint pain symptoms were noted.  Humira was judged to be a failed therapeutic, and it was discontinued.  A trial of Cosentyx 150 mg was begun.    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  Patient had dramatic benefit with use of Humira from 2018 through 2020.  Humira lost efficacy in the summer 2020.  Cosentyx was started in January 2021.    Interval history 03-:    Within 48 hours of receiving the first dose of sarilumab in late January 2021, he experienced near complete resolution of pain, stiffness, and/or swelling in his right third MCP, right midfoot, and base of his left thumb.  Difficult fist formation on the right disappeared within the first week after starting sarilumab.  He denies injection site reactions, fevers chills or sweats.    He had an episode of abdominal pain, diarrhea, and fever 100.8 shortly after returning from a trip to [place] in the last month.  He requests consideration of coronavirus IgG and IgM measurement    Interval history 11-:    Still noting dosing cycle-dependent symptoms; he gets swelling 7 days after humira, in R 3rd MC, R midfoot. Base of L thumb is also involved. +difficult fist formation on the R. Painful walking on R foot. Morning stiffness is not prominent outside \"hot spots\".     Review of Systems:   Pertinent items are noted in HPI or as below, remainder of complete ROS is negative.      No recent problems with hearing or vision. No swallowing problems.   No breathing difficulty, shortness of breath, coughing, or wheezing  No chest pain or " palpitations  No heart burn, indigestion, abdominal pain, nausea, vomiting, diarrhea  No urination problems, no bloody, cloudy urine, no dysuria  No numbing, tingling, weakness  No headaches or confusion  No rashes. No easy bleeding or bruising.     Active Medications:     Current Outpatient Medications:      finasteride (PROSCAR) 5 MG tablet, , Disp: , Rfl: 0     secukinumab (COSENTYX SENSOREADY PEN) 150 MG/ML Sensoready pen, Inject 1 mL (150 mg) Subcutaneous once a week At weeks 0, 1, 2, 3, and 4 and every 4 weeks there after loading dose., Disp: 1 mL, Rfl: 3     secukinumab (COSENTYX SENSOREADY PEN) 150 MG/ML Sensoready pen, Inject 1 mL (150 mg) Subcutaneous every 28 days Hold for signs of infection, and seek medical attention. (Patient not taking: Reported on 3/2/2021), Disp: 1 each, Rfl: 5      Allergies:   Penicillins      Past Medical History:  Seronegative arthritis - treated with sulfasalazine and oral diclofenac, see note of May 2018     Past Surgical History:  Right ankle fracture; 2012  [place] teeth removal     Family History:   Father - rheumatoid arthritis   GF and mother: cancer     Social History:   No smoking or tobacco use  Moderate alcohol use, 3x/week  Works as  at [employer]     Physical Exam:   BP (!) 140/78 (BP Location: Right arm, Patient Position: Sitting, Cuff Size: Adult Large)   Pulse 91   Wt 85.4 kg (188 lb 4.8 oz)   SpO2 98%   BMI 27.61 kg/m     Wt Readings from Last 4 Encounters:   03/02/21 85.4 kg (188 lb 4.8 oz)   01/29/20 88.8 kg (195 lb 12.8 oz)   08/16/19 87 kg (191 lb 12.8 oz)   02/01/19 89.6 kg (197 lb 8 oz)     Constitutional: Well-developed, appearing stated age; cooperative  Eyes: Normal EOM, PERRLA, vision, conjunctiva, sclera  ENT: Normal external ears, nose, hearing, lips, teeth, gums, throat. No mucous membrane lesions, normal saliva pool  Neck: No mass or thyroid enlargement  MS: No tenderness at any MCPs, PIPs, or MTPs.  Full range of motion at MCPs,  PIPs, and MTPs.  Right wrist lacks 20 degrees of extension.  Otherwise, excellent fist formation, and full range of motion in elbows shoulders ankles and feet.  Skin: No nail pitting, alopecia, rash, nodules or lesions other than punctate erythematous macule located over the left dorsal forefoot; scaling erythematous patch between the second and first toes on the left.  Neuro: Normal cranial nerves  Psych: Normal judgement, orientation, memory, affect.     Laboratory:   RHEUM RESULTS Latest Ref Rng & Units 7/29/2020 9/15/2020 11/24/2020   SED RATE 0 - 15 mm/h 8 - -   CRP, INFLAMMATION 0.0 - 8.0 mg/L 6.8 - 3.3   CK TOTAL 30 - 300 U/L - - -   RHEUMATOID FACTOR <20 IU/mL - - -   AST 0 - 45 U/L 52(H) 43 38   ALT 0 - 70 U/L 83(H) 79(H) 42   ALBUMIN 3.4 - 5.0 g/dL - - 4.5   WBC 4.0 - 11.0 10e9/L - - 5.8   RBC 4.4 - 5.9 10e12/L - - 4.70   HGB 13.3 - 17.7 g/dL - - 15.3   HCT 40.0 - 53.0 % - - 44.7   MCV 78 - 100 fl - - 95   MCHC 31.5 - 36.5 g/dL - - 34.2   RDW 10.0 - 15.0 % - - 11.1    - 450 10e9/L - - 231   CREATININE 0.66 - 1.25 mg/dL 0.96 - 1.04   GFR ESTIMATE, IF BLACK >60 mL/min/[1.73:m2] >90 - >90   GFR ESTIMATE >60 mL/min/[1.73:m2] >90 - >90   HEPATITIS C ANTIBODY NR:Nonreactive - - -       Rheumatoid Factor   Date Value Ref Range Status   02/27/2018 <20 <20 IU/mL Final     Cyclic Citrullinated Peptide Antibody, IgG   Date Value Ref Range Status   02/27/2018 1 <7 U/mL Final     Comment:     Negative     Hep B Surface Agn   Date Value Ref Range Status   05/24/2018 Nonreactive NR^Nonreactive Final     Sincerely,    Tay Calderon MD

## 2021-03-03 LAB
SARS-COV-2 AB PNL SERPL IA: NEGATIVE
SARS-COV-2 IGG SERPL IA-ACNC: NORMAL

## 2021-04-25 ENCOUNTER — HEALTH MAINTENANCE LETTER (OUTPATIENT)
Age: 31
End: 2021-04-25

## 2021-08-18 ENCOUNTER — MYC MEDICAL ADVICE (OUTPATIENT)
Dept: RHEUMATOLOGY | Facility: CLINIC | Age: 31
End: 2021-08-18

## 2021-08-18 DIAGNOSIS — L40.50 PSORIATIC ARTHRITIS (H): ICD-10-CM

## 2021-08-19 DIAGNOSIS — L40.50 PSORIATIC ARTHRITIS (H): ICD-10-CM

## 2021-08-19 RX ORDER — SECUKINUMAB 150 MG/ML
INJECTION SUBCUTANEOUS
Qty: 1 ML | Refills: 0 | Status: SHIPPED | OUTPATIENT
Start: 2021-08-19 | End: 2021-09-17

## 2021-08-19 NOTE — TELEPHONE ENCOUNTER
secukinumab (COSENTYX SENSOREADY PEN) 150 MG/ML Sensoready pen      Last Written Prescription Date:  3-2-21  Last Fill Quantity: 1 m,   # refills: 3  Last Office Visit : 3-2-21  Future Office visit:  9-17-21    Routing refill request to provider for review/approval because:  Med not on protocol      
Name band;

## 2021-08-24 RX ORDER — SECUKINUMAB 150 MG/ML
INJECTION SUBCUTANEOUS
Qty: 1 ML | Refills: 0 | OUTPATIENT
Start: 2021-08-24

## 2021-08-24 NOTE — TELEPHONE ENCOUNTER
secukinumab (COSENTYX SENSOREADY PEN) 150 MG/ML Sensoready pen    secukinumab (COSENTYX SENSOREADY PEN) 150 MG/ML Sensoready pen 1 mL 0 8/19/2021  No   Sig: Inject 1 mL (150 mg) Subcutaneous every 28 days Hold for signs of infection, and seek medical attention.Please keep 9-17-21 clinic appt for refills.   Sent to pharmacy as: Cosentyx Sensoready Pen 150 MG/ML Subcutaneous Solution Auto-injector (secukinumab)   Class: E-Prescribe   Notes to Pharmacy: Pt needs ASAP- please contact pt when med ready-thx!   Order: [id number]   E-Prescribing Status: Receipt confirmed by pharmacy (8/20/2021 10:29 AM CDT)   Printout Tracking    External Result Report   Medication Administration Instructions    Inject 1 mL (150 mg) Subcutaneous every 28 days Hold for signs of infection, and seek medical attention.Please keep 9-17-21 clinic appt for refills.   Pharmacy    [facility] - [place], [place] - [address]

## 2021-08-24 NOTE — TELEPHONE ENCOUNTER
secukinumab (COSENTYX SENSOREADY PEN) 150 MG/ML Sensoready pen 1 mL 0 8/19/2021     Duplicate- patient needs t keep 9/17/21 appt for refills

## 2021-09-16 NOTE — PROGRESS NOTES
[facility]  Rheumatology Clinic-in person  Tay Calderon MD  2021    Name: Jermaine Acosta  MRN: [id number]  Age: 31 year old  : 1990  Referring provider: Pat Bell      Assessment and Plan:  # Seronegative spondyloarthropathy:  Patient relates continued complete resolution of symptoms involving the right third finger, left base of thumb, and right forefoot that afflicted him through the 2020, since beginning secukinumab treatment in 2021.  Joint exam is benign. laboratory evaluation on 2021 showed CBC, albumin, and creatinine normal; AST was minimally elevated at 46.    Inflammatory oligoarthritis remains dramatically improved with a strong temporal association between start of secukinumab and reduction in symptoms.  I recommend continuing anti-IL 17 therapy.  He completed the secukinumab loading schedule in 2021, and has maintained every 28 to 30-day injections of 150 mg since then.  No injection site reactions or secukinumab dosing cycle dependent symptoms.    # Febrile illness, 2021: No recurrence.    # Coccygeal pain: The transient, positional nature of the pain argues against causal association with systemic rheumatic disease. Pain resolved.    # Shoulder pain: Pain is mild, posterior, and associated with performance of posterior shoulder muscle activation during weightlifting.  I do not suspect systemic rheumatic disease.    #Elevated transaminase: Minimally elevated value of ALT alone does not likely reflect liver or tissue toxicity.  Recheck LFTs in 4 to 6 months.    We discussed the following plan in detail:    Plan:  1.  Check CBC, LFTs, creatinine, and inflammatory marker today.  2. continue to inject cosentyx 150 mg every 28 days    Follow-up: Return in about 6 mos    Tay Calderon MD  Staff Rheumatologist, [facility]      HPI:   Jermaine Acosta has a history of seronegative spondyloarthropathy; he presents for follow up.  He was last  seen 3-2021, when lower extremity predominant oligoarthritis was judged improved in conjunction with starting Cosentyx.  Continuation of secukinumab was recommended.    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  Patient had dramatic benefit with use of Humira from 2018 through 2020.  Humira lost efficacy in the summer 2020.  Cosentyx was started in January 2021.    Interval history 09-:    He reports no musculoskeletal symptoms outside of intermittent, mild left greater than right posterior shoulder discomfort.  The discomfort is most noticeable during or shortly after heavy weightlifting episodes in the gym.  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  No injection site reactions with secukinumab.  He has injected every 28 to 30 days 150 mg without fail.    No recurrence of febrile, gastrointestinal illness noted in January 2021.  He is fully vaccinated.    Interval history 03-:    Within 48 hours of receiving the first dose of secukinumab in late January 2021, he experienced near complete resolution of pain, stiffness, and/or swelling in his right third MCP, right midfoot, and base of his left thumb.  Difficult fist formation on the right disappeared within the first week after starting secukinumab.  He denies injection site reactions, fevers chills or sweats.    He had an episode of abdominal pain, diarrhea, and fever 100.8 shortly after returning from a trip to [place] in the last month.  He requests consideration of coronavirus IgG and IgM measurement    Review of Systems:   Pertinent items are noted in HPI or as below, remainder of complete ROS is negative.      No recent problems with hearing or vision. No swallowing problems.   No breathing difficulty, shortness of breath, coughing, or  wheezing  No chest pain or palpitations  No heart burn, indigestion, abdominal pain, nausea, vomiting, diarrhea  No urination problems, no bloody, cloudy urine, no dysuria  No numbing, tingling, weakness  No headaches or confusion  No rashes. No easy bleeding or bruising.     Active Medications:     Current Outpatient Medications   Medication Sig     finasteride (PROSCAR) 5 MG tablet      secukinumab (COSENTYX SENSOREADY PEN) 150 MG/ML Sensoready pen Inject 1 mL (150 mg) Subcutaneous every 28 days Hold for signs of infection, and seek medical attention.Please keep 9-17-21 clinic appt for refills.     secukinumab (COSENTYX SENSOREADY PEN) 150 MG/ML Sensoready pen Inject 1 mL (150 mg) Subcutaneous every 28 days Hold for signs of infection, and seek medical attention. (Patient not taking: Reported on 3/2/2021)     No current facility-administered medications for this visit.          Allergies:   Pcn [penicillins]      Past Medical History:  Seronegative arthritis - treated with sulfasalazine and oral diclofenac, see note of May 2018     Past Surgical History:  Right ankle fracture; 2012  [place] teeth removal     Family History:   Father - rheumatoid arthritis   GF and mother: cancer     Social History:   No smoking or tobacco use  Moderate alcohol use, 3x/week  Works as  at [employer]     Physical Exam:   /87 (BP Location: Right arm, Patient Position: Sitting, Cuff Size: Adult Regular)   Pulse 73   Wt 85.8 kg (189 lb 3.2 oz)   SpO2 98%   BMI 27.74 kg/m     Wt Readings from Last 4 Encounters:   09/17/21 85.8 kg (189 lb 3.2 oz)   03/02/21 85.4 kg (188 lb 4.8 oz)   01/29/20 88.8 kg (195 lb 12.8 oz)   08/16/19 87 kg (191 lb 12.8 oz)     Constitutional: Muscular, powerfully built, appearing stated age; cooperative  Eyes: Normal EOM, PERRLA, vision, conjunctiva, sclera  ENT: Normal external ears, nose, hearing, lips, teeth, gums, throat. No mucous membrane lesions, normal saliva pool  Neck: No mass or  thyroid enlargement  MS: No tenderness at any MCPs, PIPs, or MTPs.  Full range of motion at MCPs, PIPs, and MTPs.  Right wrist lacks 20 degrees of extension.  Otherwise, excellent fist formation, and full range of motion in elbows shoulders ankles and feet.  Skin: No nail pitting, alopecia, rash, nodules or lesions  Neuro: Normal cranial nerves  Psych: Normal judgement, orientation, memory, affect.     Laboratory:   RHEUM RESULTS Latest Ref Rng & Units 2/27/2018 5/24/2018 8/16/2019   ALBUMIN 3.4 - 5.0 g/dL 4.3 - -   ALT 0 - 70 U/L 46 - 40   AST 0 - 45 U/L 28 - 30   CK TOTAL 30 - 300 U/L 214 - -   CREATININE 0.66 - 1.25 mg/dL 0.90 - 0.96   CRP 0.0 - 8.0 mg/L 6.0 - -   GFR ESTIMATE, IF BLACK >60 mL/min/[1.73:m2] >90 - >90   GFR ESTIMATE >60 mL/min/1.73m2 >90 - >90   HEMATOCRIT 40.0 - 53.0 % 45.4 - 46.9   HEMOGLOBIN 13.3 - 17.7 g/dL 15.2 - 15.9   HEPBANG NR:Nonreactive - Nonreactive -   HCVAB NR:Nonreactive - Nonreactive -   WBC 4.0 - 11.0 10e3/uL 6.3 - 4.2   RBC 4.40 - 5.90 10e6/uL 4.63 - 4.80   RDW 10.0 - 15.0 % 11.4 - 11.5   MCHC 31.5 - 36.5 g/dL 33.5 - 33.9   MCV 78 - 100 fL 98 - 98   PLATELET COUNT 150 - 450 10e3/uL 187 - 190   RHEUMATOID FACTOR <20 IU/mL <20 - -   ESR 0 - 15 mm/h 7 - -       Rheumatoid Factor   Date Value Ref Range Status   02/27/2018 <20 <20 IU/mL Final     Cyclic Citrullinated Peptide Antibody, IgG   Date Value Ref Range Status   02/27/2018 1 <7 U/mL Final     Comment:     Negative     Hep B Surface Agn   Date Value Ref Range Status   05/24/2018 Nonreactive NR^Nonreactive Final

## 2021-09-17 ENCOUNTER — LAB (OUTPATIENT)
Dept: LAB | Facility: CLINIC | Age: 31
End: 2021-09-17
Attending: INTERNAL MEDICINE
Payer: COMMERCIAL

## 2021-09-17 ENCOUNTER — OFFICE VISIT (OUTPATIENT)
Dept: RHEUMATOLOGY | Facility: CLINIC | Age: 31
End: 2021-09-17
Attending: INTERNAL MEDICINE
Payer: COMMERCIAL

## 2021-09-17 VITALS
BODY MASS INDEX: 27.74 KG/M2 | DIASTOLIC BLOOD PRESSURE: 87 MMHG | HEART RATE: 73 BPM | WEIGHT: 189.2 LBS | SYSTOLIC BLOOD PRESSURE: 126 MMHG | OXYGEN SATURATION: 98 %

## 2021-09-17 DIAGNOSIS — L40.50 PSORIATIC ARTHRITIS (H): ICD-10-CM

## 2021-09-17 LAB
ALBUMIN SERPL-MCNC: 4.6 G/DL (ref 3.4–5)
ALT SERPL W P-5'-P-CCNC: 70 U/L (ref 0–70)
AST SERPL W P-5'-P-CCNC: 46 U/L (ref 0–45)
CREAT SERPL-MCNC: 1.07 MG/DL (ref 0.66–1.25)
ERYTHROCYTE [DISTWIDTH] IN BLOOD BY AUTOMATED COUNT: 11.2 % (ref 10–15)
GFR SERPL CREATININE-BSD FRML MDRD: >90 ML/MIN/1.73M2
HCT VFR BLD AUTO: 41.4 % (ref 40–53)
HGB BLD-MCNC: 14.8 G/DL (ref 13.3–17.7)
MCH RBC QN AUTO: 33.6 PG (ref 26.5–33)
MCHC RBC AUTO-ENTMCNC: 35.7 G/DL (ref 31.5–36.5)
MCV RBC AUTO: 94 FL (ref 78–100)
PLATELET # BLD AUTO: 188 10E3/UL (ref 150–450)
RBC # BLD AUTO: 4.41 10E6/UL (ref 4.4–5.9)
WBC # BLD AUTO: 4.6 10E3/UL (ref 4–11)

## 2021-09-17 PROCEDURE — 36415 COLL VENOUS BLD VENIPUNCTURE: CPT | Performed by: PATHOLOGY

## 2021-09-17 PROCEDURE — 84460 ALANINE AMINO (ALT) (SGPT): CPT | Performed by: PATHOLOGY

## 2021-09-17 PROCEDURE — 99214 OFFICE O/P EST MOD 30 MIN: CPT | Performed by: INTERNAL MEDICINE

## 2021-09-17 PROCEDURE — 85027 COMPLETE CBC AUTOMATED: CPT | Performed by: PATHOLOGY

## 2021-09-17 PROCEDURE — 84450 TRANSFERASE (AST) (SGOT): CPT | Performed by: PATHOLOGY

## 2021-09-17 PROCEDURE — 82565 ASSAY OF CREATININE: CPT | Performed by: PATHOLOGY

## 2021-09-17 PROCEDURE — 82040 ASSAY OF SERUM ALBUMIN: CPT | Performed by: PATHOLOGY

## 2021-09-17 PROCEDURE — G0463 HOSPITAL OUTPT CLINIC VISIT: HCPCS

## 2021-09-17 RX ORDER — SECUKINUMAB 150 MG/ML
INJECTION SUBCUTANEOUS
Qty: 1 ML | Refills: 10 | Status: SHIPPED | OUTPATIENT
Start: 2021-09-17 | End: 2022-08-10

## 2021-09-17 NOTE — NURSING NOTE
Chief Complaint   Patient presents with     RECHECK     follow up         /87 (BP Location: Right arm, Patient Position: Sitting, Cuff Size: Adult Regular)   Pulse 73   Wt 85.8 kg (189 lb 3.2 oz)   SpO2 98%   BMI 27.74 kg/m          Dayday Melendez, EMT

## 2021-09-17 NOTE — PATIENT INSTRUCTIONS
# Seronegative spondyloarthropathy: Inflammatory symptoms in hands and feet continue markedly diminished since starting Cosentyx in late January 2021. I recommend continuing Cosentyx every  28 days.    Plan:  1.  Check CBC, LFTs, creatinine, and inflammatory marker today.  2. continue to inject cosentyx 150 mg every 28 days

## 2021-09-17 NOTE — LETTER
2021       RE: Jermaine Acosta  [address] [facility] [address]     Dear Colleague,    Thank you for referring your patient, Jermaine Acosta, to the [facility] RHEUMATOLOGY [facility] [place] at [facility]. Please see a copy of my visit note below.    [facility]  Rheumatology Clinic-in person  Tay Calderon MD  2021    Name: Jermaine Acosta  MRN: [id number]  Age: 31 year old  : 1990  Referring provider: Pat Bell      Assessment and Plan:  # Seronegative spondyloarthropathy:  Patient relates continued complete resolution of symptoms involving the right third finger, left base of thumb, and right forefoot that afflicted him through the 2020, since beginning secukinumab treatment in 2021.  Joint exam is benign. laboratory evaluation on 2021 showed CBC, albumin, and creatinine normal; AST was minimally elevated at 46.    Inflammatory oligoarthritis remains dramatically improved with a strong temporal association between start of secukinumab and reduction in symptoms.  I recommend continuing anti-IL 17 therapy.  He completed the secukinumab loading schedule in 2021, and has maintained every 28 to 30-day injections of 150 mg since then.  No injection site reactions or secukinumab dosing cycle dependent symptoms.    # Febrile illness, 2021: No recurrence.    # Coccygeal pain: The transient, positional nature of the pain argues against causal association with systemic rheumatic disease. Pain resolved.    # Shoulder pain: Pain is mild, posterior, and associated with performance of posterior shoulder muscle activation during weightlifting.  I do not suspect systemic rheumatic disease.    #Elevated transaminase: Minimally elevated value of ALT alone does not likely reflect liver or tissue toxicity.  Recheck LFTs in 4 to 6 months.    We discussed the following plan in detail:    Plan:  1.   Check CBC, LFTs, creatinine, and inflammatory marker today.  2. continue to inject cosentyx 150 mg every 28 days    Follow-up: Return in about 6 mos    Tay Calderon MD  Staff Rheumatologist, [facility]:   Jermaine Acosta has a history of seronegative spondyloarthropathy; he presents for follow up.  He was last seen 3-2021, when lower extremity predominant oligoarthritis was judged improved in conjunction with starting Cosentyx.  Continuation of secukinumab was recommended.    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  Patient had dramatic benefit with use of Humira from 2018 through 2020.  Humira lost efficacy in the summer 2020.  Cosentyx was started in January 2021.    Interval history 09-:    He reports no musculoskeletal symptoms outside of intermittent, mild left greater than right posterior shoulder discomfort.  The discomfort is most noticeable during or shortly after heavy weightlifting episodes in the gym.  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  No injection site reactions with secukinumab.  He has injected every 28 to 30 days 150 mg without fail.    No recurrence of febrile, gastrointestinal illness noted in January 2021.  He is fully vaccinated.    Interval history 03-:    Within 48 hours of receiving the first dose of secukinumab in late January 2021, he experienced near complete resolution of pain, stiffness, and/or swelling in his right third MCP, right midfoot, and base of his left thumb.  Difficult fist formation on the right disappeared within the first week after starting secukinumab.  He denies injection site reactions, fevers chills or sweats.    He had an episode of abdominal pain, diarrhea, and fever 100.8 shortly after returning from a trip to [place] in  the last month.  He requests consideration of coronavirus IgG and IgM measurement    Review of Systems:   Pertinent items are noted in HPI or as below, remainder of complete ROS is negative.      No recent problems with hearing or vision. No swallowing problems.   No breathing difficulty, shortness of breath, coughing, or wheezing  No chest pain or palpitations  No heart burn, indigestion, abdominal pain, nausea, vomiting, diarrhea  No urination problems, no bloody, cloudy urine, no dysuria  No numbing, tingling, weakness  No headaches or confusion  No rashes. No easy bleeding or bruising.     Active Medications:     Current Outpatient Medications   Medication Sig     finasteride (PROSCAR) 5 MG tablet      secukinumab (COSENTYX SENSOREADY PEN) 150 MG/ML Sensoready pen Inject 1 mL (150 mg) Subcutaneous every 28 days Hold for signs of infection, and seek medical attention.Please keep 9-17-21 clinic appt for refills.     secukinumab (COSENTYX SENSOREADY PEN) 150 MG/ML Sensoready pen Inject 1 mL (150 mg) Subcutaneous every 28 days Hold for signs of infection, and seek medical attention. (Patient not taking: Reported on 3/2/2021)     No current facility-administered medications for this visit.          Allergies:   Pcn [penicillins]      Past Medical History:  Seronegative arthritis - treated with sulfasalazine and oral diclofenac, see note of May 2018     Past Surgical History:  Right ankle fracture; 2012  [place] teeth removal     Family History:   Father - rheumatoid arthritis   GF and mother: cancer     Social History:   No smoking or tobacco use  Moderate alcohol use, 3x/week  Works as  at [employer]     Physical Exam:   /87 (BP Location: Right arm, Patient Position: Sitting, Cuff Size: Adult Regular)   Pulse 73   Wt 85.8 kg (189 lb 3.2 oz)   SpO2 98%   BMI 27.74 kg/m     Wt Readings from Last 4 Encounters:   09/17/21 85.8 kg (189 lb 3.2 oz)   03/02/21 85.4 kg (188 lb 4.8 oz)   01/29/20 88.8  kg (195 lb 12.8 oz)   08/16/19 87 kg (191 lb 12.8 oz)     Constitutional: Muscular, powerfully built, appearing stated age; cooperative  Eyes: Normal EOM, PERRLA, vision, conjunctiva, sclera  ENT: Normal external ears, nose, hearing, lips, teeth, gums, throat. No mucous membrane lesions, normal saliva pool  Neck: No mass or thyroid enlargement  MS: No tenderness at any MCPs, PIPs, or MTPs.  Full range of motion at MCPs, PIPs, and MTPs.  Right wrist lacks 20 degrees of extension.  Otherwise, excellent fist formation, and full range of motion in elbows shoulders ankles and feet.  Skin: No nail pitting, alopecia, rash, nodules or lesions  Neuro: Normal cranial nerves  Psych: Normal judgement, orientation, memory, affect.     Laboratory:   RHEUM RESULTS Latest Ref Rng & Units 2/27/2018 5/24/2018 8/16/2019   ALBUMIN 3.4 - 5.0 g/dL 4.3 - -   ALT 0 - 70 U/L 46 - 40   AST 0 - 45 U/L 28 - 30   CK TOTAL 30 - 300 U/L 214 - -   CREATININE 0.66 - 1.25 mg/dL 0.90 - 0.96   CRP 0.0 - 8.0 mg/L 6.0 - -   GFR ESTIMATE, IF BLACK >60 mL/min/[1.73:m2] >90 - >90   GFR ESTIMATE >60 mL/min/1.73m2 >90 - >90   HEMATOCRIT 40.0 - 53.0 % 45.4 - 46.9   HEMOGLOBIN 13.3 - 17.7 g/dL 15.2 - 15.9   HEPBANG NR:Nonreactive - Nonreactive -   HCVAB NR:Nonreactive - Nonreactive -   WBC 4.0 - 11.0 10e3/uL 6.3 - 4.2   RBC 4.40 - 5.90 10e6/uL 4.63 - 4.80   RDW 10.0 - 15.0 % 11.4 - 11.5   MCHC 31.5 - 36.5 g/dL 33.5 - 33.9   MCV 78 - 100 fL 98 - 98   PLATELET COUNT 150 - 450 10e3/uL 187 - 190   RHEUMATOID FACTOR <20 IU/mL <20 - -   ESR 0 - 15 mm/h 7 - -       Rheumatoid Factor   Date Value Ref Range Status   02/27/2018 <20 <20 IU/mL Final     Cyclic Citrullinated Peptide Antibody, IgG   Date Value Ref Range Status   02/27/2018 1 <7 U/mL Final     Comment:     Negative     Hep B Surface Agn   Date Value Ref Range Status   05/24/2018 Nonreactive NR^Nonreactive Final             Again, thank you for allowing me to participate in the care of your patient.       Sincerely,    Tay Calderon MD

## 2021-09-20 RX ORDER — SECUKINUMAB 150 MG/ML
INJECTION SUBCUTANEOUS
Qty: 1 ML | Refills: 0 | OUTPATIENT
Start: 2021-09-20

## 2021-09-20 NOTE — TELEPHONE ENCOUNTER
secukinumab (COSENTYX SENSOREADY PEN) 150 MG/ML Sensoready pen   Disp Refills Start End [facility]   secukinumab (COSENTYX SENSOREADY PEN) 150 MG/ML Sensoready pen 1 mL 10 9/17/2021  No   Sig: Inject 1 mL (150 mg) Subcutaneous every 28 days Hold for signs of infection, and seek medical attention.Please keep 9-17-21 clinic appt for refills.   Class: E-Prescribe   Order: [id number]       Printout Tracking    External Result Report     Medication Administration Instructions    Inject 1 mL (150 mg) Subcutaneous every 28 days Hold for signs of infection, and seek medical attention.Please keep 9-17-21 clinic appt for refills.     Pharmacy    [facility] - [place], [place] - [address]

## 2021-10-10 ENCOUNTER — HEALTH MAINTENANCE LETTER (OUTPATIENT)
Age: 31
End: 2021-10-10

## 2022-05-21 ENCOUNTER — HEALTH MAINTENANCE LETTER (OUTPATIENT)
Age: 32
End: 2022-05-21

## 2022-08-08 DIAGNOSIS — L40.50 PSORIATIC ARTHRITIS (H): ICD-10-CM

## 2022-08-10 DIAGNOSIS — L40.50 PSORIATIC ARTHRITIS (H): ICD-10-CM

## 2022-08-10 RX ORDER — SECUKINUMAB 150 MG/ML
150 INJECTION SUBCUTANEOUS
Qty: 1 ML | Refills: 3 | Status: SHIPPED | OUTPATIENT
Start: 2022-08-10 | End: 2022-12-09

## 2022-08-10 RX ORDER — SECUKINUMAB 150 MG/ML
150 INJECTION SUBCUTANEOUS
Qty: 1 ML | Refills: 3 | Status: SHIPPED | OUTPATIENT
Start: 2022-08-10 | End: 2023-12-08

## 2022-08-10 NOTE — TELEPHONE ENCOUNTER
Medication/Dose: secukinumab (COSENTYX SENSOREADY PEN) 150 MG/ML Sensoready pen    Last Written : 9/17/21  Last Quantity: 1 mL, # refills: 10  Last Office Visit :  9/17/21  Pending appointment: 12/9/22       Prescription set up and routed to provider per Refill Protocol.    LOPEZ RidleyN, RN

## 2022-08-10 NOTE — TELEPHONE ENCOUNTER
secukinumab (COSENTYX SENSOREADY PEN) 150 MG/ML Sensoready pen  Resent order,   First order not received  1 mL, 3 Refills sent to nilesh Gorman RN  Central Triage Red Flags/Med Refills

## 2022-09-18 ENCOUNTER — HEALTH MAINTENANCE LETTER (OUTPATIENT)
Age: 32
End: 2022-09-18

## 2022-12-07 NOTE — PROGRESS NOTES
[facility]  Rheumatology Clinic  Tay Calderon MD  2022    Name: Jermaine Acosta  MRN: [id number]  Age: 32 year old  : 1990  Referring provider: Pat Bell      Assessment and Plan:  # Seronegative spondyloarthropathy:  Patient relates continued near-complete resolution of symptoms involving the right third finger, left base of thumb, and right forefoot that afflicted him through the fall . Improvement correlated with start of secukinumab treatment in 2021.  Joint exam is benign. laboratory evaluation on 2021 showed CBC, albumin, and creatinine normal; AST was minimally elevated at 46.    Inflammatory oligoarthritis remains dramatically improved with a strong temporal association between start of secukinumab and reduction in symptoms.  I recommend continuing anti-IL 17 therapy.  He completed the secukinumab loading schedule in 2021, and has maintained every 28 to 30-day injections of 150 mg since then.  No injection site reactions or secukinumab dosing cycle dependent symptoms.    # Coccygeal pain: The transient, positional nature of the pain argues against causal association with systemic rheumatic disease. Pain resolved.    We discussed the following plan in detail:    Plan:  1.  Check CBC, LFTs, creatinine, inflammatory marker today.  2. continue to inject cosentyx 150 mg every month    Follow-up: Return in about 12 mos    Tay Calderon MD  Staff Rheumatologist, [facility]      HPI:   Jermaine Acosta has a history of seronegative spondyloarthropathy; he presents for follow up.  He was last seen , when lower extremity predominant oligoarthritis was judged improved in conjunction with starting Cosentyx.  Continuation of secukinumab was recommended.    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  Patient had dramatic benefit with use of Humira from  through .  Humira lost  "efficacy in the summer 2020.  Cosentyx was started in January 2021.    Interval history December 9, 2022    He is doing great. He has a \"twinge\" in base of $R 3rd finger and L forefooot after delaying dose 9 days. Otherwise no skin or joint symptoms.  He has been taking cosentyx every 30 days.  No return of sit bone pain.  He is working out, started playing golf, walking 36 holes per week in summer; playing softball, lifting weights every day.  He noted ~ 15 lb wt loss during the summer; wt has stabilized since.  Work is going well; he is up for tenure at [employer] this year.      Interval history 09-:    He reports no musculoskeletal symptoms outside of intermittent, mild left greater than right posterior shoulder discomfort.  The discomfort is most noticeable during or shortly after heavy weightlifting episodes in the gym.  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  No injection site reactions with secukinumab.  He has injected every 28 to 30 days 150 mg without fail.    No recurrence of febrile, gastrointestinal illness noted in January 2021.  He is fully vaccinated.    Interval history 03-:    Within 48 hours of receiving the first dose of secukinumab in late January 2021, he experienced near complete resolution of pain, stiffness, and/or swelling in his right third MCP, right midfoot, and base of his left thumb.  Difficult fist formation on the right disappeared within the first week after starting secukinumab.  He denies injection site reactions, fevers chills or sweats.    He had an episode of abdominal pain, diarrhea, and fever 100.8 shortly after returning from a trip to [place] in the last month.  He requests consideration of coronavirus IgG and IgM measurement    Review of Systems:   Pertinent items are noted in HPI or as below, remainder of complete ROS is " "negative.      No recent problems with hearing or vision. No swallowing problems.   No breathing difficulty, shortness of breath, coughing, or wheezing  No chest pain or palpitations  No heart burn, indigestion, abdominal pain, nausea, vomiting, diarrhea  No urination problems, no bloody, cloudy urine, no dysuria  No numbing, tingling, weakness  No headaches or confusion  No rashes. No easy bleeding or bruising.     Active Medications:     Current Outpatient Medications   Medication Sig     finasteride (PROSCAR) 5 MG tablet      secukinumab (COSENTYX SENSOREADY PEN) 150 MG/ML Sensoready pen Inject 1 mL (150 mg) Subcutaneous every 28 days Inject 1 mL (150 mg) Subcutaneous every 28 days Hold for signs of infection, and seek medical attention.Please keep 12/9/22 clinic appt for refills.     secukinumab (COSENTYX SENSOREADY PEN) 150 MG/ML Sensoready pen Inject 1 mL (150 mg) Subcutaneous every 28 days Hold for signs of infection, and seek medical attention.     No current facility-administered medications for this visit.          Allergies:   Pcn [penicillins]      Past Medical History:  Seronegative arthritis - treated with sulfasalazine and oral diclofenac, see note of May 2018     Past Surgical History:  Right ankle fracture; 2012  [place] teeth removal     Family History:   Father - rheumatoid arthritis   GF and mother: cancer     Social History:   No smoking or tobacco use  Moderate alcohol use, 3x/week  Works as  at [employer]     Physical Exam:   /80   Pulse 74   Ht 1.765 m (5' 9.5\")   Wt 84.2 kg (185 lb 9.6 oz)   SpO2 95%   BMI 27.02 kg/m     Wt Readings from Last 4 Encounters:   12/09/22 84.2 kg (185 lb 9.6 oz)   09/17/21 85.8 kg (189 lb 3.2 oz)   03/02/21 85.4 kg (188 lb 4.8 oz)   01/29/20 88.8 kg (195 lb 12.8 oz)     Constitutional: Muscular, powerfully built, appearing stated age; cooperative  Eyes: Normal EOM, PERRLA, vision, conjunctiva, sclera  ENT: Normal external ears, nose, " hearing, lips, teeth, gums, throat. No mucous membrane lesions, normal saliva pool  Neck: No mass or thyroid enlargement  MS: No tenderness at any MCPs, PIPs, or MTPs.  Full range of motion at MCPs, PIPs, and MTPs.  Right wrist lacks 20 degrees of extension.  Otherwise, excellent fist formation, and full range of motion in elbows shoulders ankles and feet.  Skin: No nail pitting, alopecia, rash, nodules or lesions  Neuro: Normal cranial nerves  Psych: Normal judgement, orientation, memory, affect.     Laboratory:   RHEUM RESULTS Latest Ref Rng & Units 2/27/2018 5/24/2018 8/16/2019   ALBUMIN 3.4 - 5.0 g/dL 4.3 - -   ALT 0 - 70 U/L 46 - 40   AST 0 - 45 U/L 28 - 30   CK TOTAL 30 - 300 U/L 214 - -   CREATININE 0.66 - 1.25 mg/dL 0.90 - 0.96   CRP 0.0 - 8.0 mg/L 6.0 - -   GFR ESTIMATE, IF BLACK >60 mL/min/[1.73:m2] >90 - >90   GFR ESTIMATE >60 mL/min/1.73m2 >90 - >90   HEMATOCRIT 40.0 - 53.0 % 45.4 - 46.9   HEMOGLOBIN 13.3 - 17.7 g/dL 15.2 - 15.9   HEPBANG NR:Nonreactive - Nonreactive -   HCVAB NR:Nonreactive - Nonreactive -   WBC 4.0 - 11.0 10e3/uL 6.3 - 4.2   RBC 4.40 - 5.90 10e6/uL 4.63 - 4.80   RDW 10.0 - 15.0 % 11.4 - 11.5   MCHC 31.5 - 36.5 g/dL 33.5 - 33.9   MCV 78 - 100 fL 98 - 98   PLATELET COUNT 150 - 450 10e3/uL 187 - 190   RHEUMATOID FACTOR <20 IU/mL <20 - -   ESR 0 - 15 mm/h 7 - -       Rheumatoid Factor   Date Value Ref Range Status   02/27/2018 <20 <20 IU/mL Final     Cyclic Citrullinated Peptide Antibody, IgG   Date Value Ref Range Status   02/27/2018 1 <7 U/mL Final     Comment:     Negative     Hep B Surface Agn   Date Value Ref Range Status   05/24/2018 Nonreactive NR^Nonreactive Final

## 2022-12-09 ENCOUNTER — OFFICE VISIT (OUTPATIENT)
Dept: RHEUMATOLOGY | Facility: CLINIC | Age: 32
End: 2022-12-09
Attending: INTERNAL MEDICINE
Payer: COMMERCIAL

## 2022-12-09 ENCOUNTER — TELEPHONE (OUTPATIENT)
Dept: RHEUMATOLOGY | Facility: CLINIC | Age: 32
End: 2022-12-09

## 2022-12-09 ENCOUNTER — LAB (OUTPATIENT)
Dept: LAB | Facility: CLINIC | Age: 32
End: 2022-12-09
Attending: INTERNAL MEDICINE
Payer: COMMERCIAL

## 2022-12-09 VITALS
HEART RATE: 74 BPM | HEIGHT: 70 IN | SYSTOLIC BLOOD PRESSURE: 139 MMHG | WEIGHT: 185.6 LBS | OXYGEN SATURATION: 95 % | DIASTOLIC BLOOD PRESSURE: 80 MMHG | BODY MASS INDEX: 26.57 KG/M2

## 2022-12-09 DIAGNOSIS — L40.50 PSORIATIC ARTHRITIS (H): Primary | ICD-10-CM

## 2022-12-09 DIAGNOSIS — L40.50 PSORIATIC ARTHRITIS (H): ICD-10-CM

## 2022-12-09 LAB
ALBUMIN SERPL BCG-MCNC: 4.8 G/DL (ref 3.5–5.2)
ALP SERPL-CCNC: 50 U/L (ref 40–129)
ALT SERPL W P-5'-P-CCNC: 88 U/L (ref 10–50)
ANION GAP SERPL CALCULATED.3IONS-SCNC: 11 MMOL/L (ref 7–15)
AST SERPL W P-5'-P-CCNC: 48 U/L (ref 10–50)
BILIRUB SERPL-MCNC: 2.4 MG/DL
BUN SERPL-MCNC: 18.8 MG/DL (ref 6–20)
CALCIUM SERPL-MCNC: 9.7 MG/DL (ref 8.6–10)
CHLORIDE SERPL-SCNC: 98 MMOL/L (ref 98–107)
CREAT SERPL-MCNC: 0.89 MG/DL (ref 0.67–1.17)
CRP SERPL-MCNC: <3 MG/L
DEPRECATED HCO3 PLAS-SCNC: 28 MMOL/L (ref 22–29)
ERYTHROCYTE [DISTWIDTH] IN BLOOD BY AUTOMATED COUNT: 11.1 % (ref 10–15)
GFR SERPL CREATININE-BSD FRML MDRD: >90 ML/MIN/1.73M2
GLUCOSE SERPL-MCNC: 101 MG/DL (ref 70–99)
HCT VFR BLD AUTO: 43.4 % (ref 40–53)
HGB BLD-MCNC: 15 G/DL (ref 13.3–17.7)
MCH RBC QN AUTO: 32.9 PG (ref 26.5–33)
MCHC RBC AUTO-ENTMCNC: 34.6 G/DL (ref 31.5–36.5)
MCV RBC AUTO: 95 FL (ref 78–100)
PLATELET # BLD AUTO: 186 10E3/UL (ref 150–450)
POTASSIUM SERPL-SCNC: 4 MMOL/L (ref 3.4–5.3)
PROT SERPL-MCNC: 7.2 G/DL (ref 6.4–8.3)
RBC # BLD AUTO: 4.56 10E6/UL (ref 4.4–5.9)
SODIUM SERPL-SCNC: 137 MMOL/L (ref 136–145)
WBC # BLD AUTO: 4.8 10E3/UL (ref 4–11)

## 2022-12-09 PROCEDURE — G0463 HOSPITAL OUTPT CLINIC VISIT: HCPCS

## 2022-12-09 PROCEDURE — 85027 COMPLETE CBC AUTOMATED: CPT | Performed by: PATHOLOGY

## 2022-12-09 PROCEDURE — 36415 COLL VENOUS BLD VENIPUNCTURE: CPT | Performed by: PATHOLOGY

## 2022-12-09 PROCEDURE — 86140 C-REACTIVE PROTEIN: CPT | Performed by: PATHOLOGY

## 2022-12-09 PROCEDURE — 80053 COMPREHEN METABOLIC PANEL: CPT | Performed by: PATHOLOGY

## 2022-12-09 PROCEDURE — 99214 OFFICE O/P EST MOD 30 MIN: CPT | Performed by: INTERNAL MEDICINE

## 2022-12-09 RX ORDER — SECUKINUMAB 150 MG/ML
150 INJECTION SUBCUTANEOUS
Qty: 1 ML | Refills: 11 | Status: SHIPPED | OUTPATIENT
Start: 2022-12-09

## 2022-12-09 NOTE — PATIENT INSTRUCTIONS
# Seronegative spondyloarthropathy: Inflammatory symptoms in hands and feet continue markedly diminished since starting Cosentyx in late January 2021. I recommend continuing Cosentyx every  28-30 days.    Plan:  1.  Check CBC, LFTs, creatinine, inflammatory marker today.  2. continue to inject cosentyx 150 mg every month

## 2022-12-09 NOTE — TELEPHONE ENCOUNTER
----- Message from Tay Calderon MD sent at 12/9/2022 10:26 AM CST -----  There are mild abnormalities in liver function tests, including less than 2 times the upper limit of normal elevation in the ALT.  Most likely explanation is normal variation in blood levels of an enzyme that can be found at low levels of persons who frequently perform heavy resistance training.  I do not think that use of Cosentyx is the reason for mild liver function abnormality.  I recommend repeating hepatic panel in approximately 1 month.  Inflammation is low.  It was a pleasure seeing you in clinic. Please let me know if you have questions.    Sincerely,    Tay Calderon MD  Rheumatologist, [facility]

## 2022-12-09 NOTE — TELEPHONE ENCOUNTER
Patient called and aware.  Lab orders for recheck in 1 month pended and ready for sig.     Ana Rodriguez RN

## 2022-12-09 NOTE — LETTER
2022       RE: Jermaine Acosta  [address] [facility] [address]     Dear Colleague,    Thank you for referring your patient, Jermaine Acosta, to the [facility] RHEUMATOLOGY CLINIC [place] at [facility]. Please see a copy of my visit note below.    [facility]  Rheumatology Clinic  Tay Calderon MD  2022    Name: Jermaine Acosta  MRN: [id number]  Age: 32 year old  : 1990  Referring provider: Pat Bell      Assessment and Plan:  # Seronegative spondyloarthropathy:  Patient relates continued near-complete resolution of symptoms involving the right third finger, left base of thumb, and right forefoot that afflicted him through the 2020. Improvement correlated with start of secukinumab treatment in 2021.  Joint exam is benign. laboratory evaluation on 2021 showed CBC, albumin, and creatinine normal; AST was minimally elevated at 46.    Inflammatory oligoarthritis remains dramatically improved with a strong temporal association between start of secukinumab and reduction in symptoms.  I recommend continuing anti-IL 17 therapy.  He completed the secukinumab loading schedule in 2021, and has maintained every 28 to 30-day injections of 150 mg since then.  No injection site reactions or secukinumab dosing cycle dependent symptoms.    # Coccygeal pain: The transient, positional nature of the pain argues against causal association with systemic rheumatic disease. Pain resolved.    We discussed the following plan in detail:    Plan:  1.  Check CBC, LFTs, creatinine, inflammatory marker today.  2. continue to inject cosentyx 150 mg every month    Follow-up: Return in about 12 mos    Tay Calderon MD  Staff Rheumatologist, [facility]      HPI:   Jermaine Acosta has a history of seronegative spondyloarthropathy; he presents for follow up.  He was last seen , when lower extremity predominant  "oligoarthritis was judged improved in conjunction with starting Cosentyx.  Continuation of secukinumab was recommended.    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  Patient had dramatic benefit with use of Humira from 2018 through 2020.  Humira lost efficacy in the summer 2020.  Cosentyx was started in January 2021.    Interval history December 9, 2022    He is doing great. He has a \"twinge\" in base of $R 3rd finger and L forefooot after delaying dose 9 days. Otherwise no skin or joint symptoms.  He has been taking cosentyx every 30 days.  No return of sit bone pain.  He is working out, started playing golf, walking 36 holes per week in summer; playing softball, lifting weights every day.  He noted ~ 15 lb wt loss during the summer; wt has stabilized since.  Work is going well; he is up for tenure at [employer] this year.      Interval history 09-:    He reports no musculoskeletal symptoms outside of intermittent, mild left greater than right posterior shoulder discomfort.  The discomfort is most noticeable during or shortly after heavy weightlifting episodes in the gym.  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  No injection site reactions with secukinumab.  He has injected every 28 to 30 days 150 mg without fail.    No recurrence of febrile, gastrointestinal illness noted in January 2021.  He is fully vaccinated.    Interval history 03-:    Within 48 hours of receiving the first dose of secukinumab in late January 2021, he experienced near complete resolution of pain, stiffness, and/or swelling in his right third MCP, right midfoot, and base of his left thumb.  Difficult fist formation on the right disappeared within the first week after starting secukinumab.  He denies injection site reactions, fevers " "chills or sweats.    He had an episode of abdominal pain, diarrhea, and fever 100.8 shortly after returning from a trip to [place] in the last month.  He requests consideration of coronavirus IgG and IgM measurement    Review of Systems:   Pertinent items are noted in HPI or as below, remainder of complete ROS is negative.      No recent problems with hearing or vision. No swallowing problems.   No breathing difficulty, shortness of breath, coughing, or wheezing  No chest pain or palpitations  No heart burn, indigestion, abdominal pain, nausea, vomiting, diarrhea  No urination problems, no bloody, cloudy urine, no dysuria  No numbing, tingling, weakness  No headaches or confusion  No rashes. No easy bleeding or bruising.     Active Medications:     Current Outpatient Medications   Medication Sig     finasteride (PROSCAR) 5 MG tablet      secukinumab (COSENTYX SENSOREADY PEN) 150 MG/ML Sensoready pen Inject 1 mL (150 mg) Subcutaneous every 28 days Inject 1 mL (150 mg) Subcutaneous every 28 days Hold for signs of infection, and seek medical attention.Please keep 12/9/22 clinic appt for refills.     secukinumab (COSENTYX SENSOREADY PEN) 150 MG/ML Sensoready pen Inject 1 mL (150 mg) Subcutaneous every 28 days Hold for signs of infection, and seek medical attention.     No current facility-administered medications for this visit.          Allergies:   Pcn [penicillins]      Past Medical History:  Seronegative arthritis - treated with sulfasalazine and oral diclofenac, see note of May 2018     Past Surgical History:  Right ankle fracture; 2012  [place] teeth removal     Family History:   Father - rheumatoid arthritis   GF and mother: cancer     Social History:   No smoking or tobacco use  Moderate alcohol use, 3x/week  Works as  at [employer]     Physical Exam:   /80   Pulse 74   Ht 1.765 m (5' 9.5\")   Wt 84.2 kg (185 lb 9.6 oz)   SpO2 95%   BMI 27.02 kg/m     Wt Readings from Last 4 Encounters: "   12/09/22 84.2 kg (185 lb 9.6 oz)   09/17/21 85.8 kg (189 lb 3.2 oz)   03/02/21 85.4 kg (188 lb 4.8 oz)   01/29/20 88.8 kg (195 lb 12.8 oz)     Constitutional: Muscular, powerfully built, appearing stated age; cooperative  Eyes: Normal EOM, PERRLA, vision, conjunctiva, sclera  ENT: Normal external ears, nose, hearing, lips, teeth, gums, throat. No mucous membrane lesions, normal saliva pool  Neck: No mass or thyroid enlargement  MS: No tenderness at any MCPs, PIPs, or MTPs.  Full range of motion at MCPs, PIPs, and MTPs.  Right wrist lacks 20 degrees of extension.  Otherwise, excellent fist formation, and full range of motion in elbows shoulders ankles and feet.  Skin: No nail pitting, alopecia, rash, nodules or lesions  Neuro: Normal cranial nerves  Psych: Normal judgement, orientation, memory, affect.     Laboratory:   RHEUM RESULTS Latest Ref Rng & Units 2/27/2018 5/24/2018 8/16/2019   ALBUMIN 3.4 - 5.0 g/dL 4.3 - -   ALT 0 - 70 U/L 46 - 40   AST 0 - 45 U/L 28 - 30   CK TOTAL 30 - 300 U/L 214 - -   CREATININE 0.66 - 1.25 mg/dL 0.90 - 0.96   CRP 0.0 - 8.0 mg/L 6.0 - -   GFR ESTIMATE, IF BLACK >60 mL/min/[1.73:m2] >90 - >90   GFR ESTIMATE >60 mL/min/1.73m2 >90 - >90   HEMATOCRIT 40.0 - 53.0 % 45.4 - 46.9   HEMOGLOBIN 13.3 - 17.7 g/dL 15.2 - 15.9   HEPBANG NR:Nonreactive - Nonreactive -   HCVAB NR:Nonreactive - Nonreactive -   WBC 4.0 - 11.0 10e3/uL 6.3 - 4.2   RBC 4.40 - 5.90 10e6/uL 4.63 - 4.80   RDW 10.0 - 15.0 % 11.4 - 11.5   MCHC 31.5 - 36.5 g/dL 33.5 - 33.9   MCV 78 - 100 fL 98 - 98   PLATELET COUNT 150 - 450 10e3/uL 187 - 190   RHEUMATOID FACTOR <20 IU/mL <20 - -   ESR 0 - 15 mm/h 7 - -       Rheumatoid Factor   Date Value Ref Range Status   02/27/2018 <20 <20 IU/mL Final     Cyclic Citrullinated Peptide Antibody, IgG   Date Value Ref Range Status   02/27/2018 1 <7 U/mL Final     Comment:     Negative     Hep B Surface Agn   Date Value Ref Range Status   05/24/2018 Nonreactive NR^Nonreactive Final

## 2022-12-09 NOTE — RESULT ENCOUNTER NOTE
There are mild abnormalities in liver function tests, including less than 2 times the upper limit of normal elevation in the ALT.  Most likely explanation is normal variation in blood levels of an enzyme that can be found at low levels of persons who frequently perform heavy resistance training.  I do not think that use of Cosentyx is the reason for mild liver function abnormality.  I recommend repeating hepatic panel in approximately 1 month.  Inflammation is low.  It was a pleasure seeing you in clinic. Please let me know if you have questions.    Sincerely,    Tay Calderon MD  Rheumatologist, [facility]

## 2022-12-09 NOTE — NURSING NOTE
"Chief Complaint   Patient presents with     RECHECK     RA     /80   Pulse 74   Ht 1.765 m (5' 9.5\")   Wt 84.2 kg (185 lb 9.6 oz)   SpO2 95%   BMI 27.02 kg/m        Greg Mendes MA  "

## 2022-12-20 ENCOUNTER — TELEPHONE (OUTPATIENT)
Dept: RHEUMATOLOGY | Facility: CLINIC | Age: 32
End: 2022-12-20

## 2022-12-20 NOTE — TELEPHONE ENCOUNTER
Called and spoke with [employer] at [phone].  They do not have any record of placing a call to us.  Then called [facility].  They state patient has BCBS and he cannot use [employer] to fill his prescriptions.  If his insurance changes as of the first of the year to something other than BCBS, patient can then transfer RX at that time.     Ana Rodriguez RN

## 2023-06-04 ENCOUNTER — HEALTH MAINTENANCE LETTER (OUTPATIENT)
Age: 33
End: 2023-06-04

## 2023-06-12 ENCOUNTER — TELEPHONE (OUTPATIENT)
Dept: RHEUMATOLOGY | Facility: CLINIC | Age: 33
End: 2023-06-12
Payer: COMMERCIAL

## 2023-06-12 NOTE — TELEPHONE ENCOUNTER
PA Initiation    Medication: COSENTYX SENSOREADY  MG/ML SC SOAJ  Insurance Company: [employer] ([facility]) - Phone [phone] Fax [phone]  Pharmacy Filling the Rx: [facility] - [place], [place] - [address]  Filling Pharmacy Phone:    Filling Pharmacy Fax:    Start Date: 6/12/2023    [id number]

## 2023-06-16 NOTE — TELEPHONE ENCOUNTER
Prior Authorization Approval    Medication: COSENTYX SENSOREADY  MG/ML SC SOAJ  Authorization Effective Date: 6/16/2023  Authorization Expiration Date: 6/12/2024  Approved Dose/Quantity: 1/28d  Reference #: [id number]   Insurance Company: [place] ([facility]) - Phone [phone] Fax [phone]  Expected CoPay:       CoPay Card Available:      Financial Assistance Needed: no  Which Pharmacy is filling the prescription: [facility] - [place], [place] - [facility] [address]   Pharmacy Notified: Yes  Patient Notified: Yes

## 2023-12-08 ENCOUNTER — LAB (OUTPATIENT)
Dept: LAB | Facility: CLINIC | Age: 33
End: 2023-12-08
Payer: COMMERCIAL

## 2023-12-08 ENCOUNTER — OFFICE VISIT (OUTPATIENT)
Dept: RHEUMATOLOGY | Facility: CLINIC | Age: 33
End: 2023-12-08
Attending: INTERNAL MEDICINE
Payer: COMMERCIAL

## 2023-12-08 VITALS
WEIGHT: 191 LBS | BODY MASS INDEX: 27.8 KG/M2 | OXYGEN SATURATION: 99 % | SYSTOLIC BLOOD PRESSURE: 148 MMHG | HEART RATE: 81 BPM | DIASTOLIC BLOOD PRESSURE: 84 MMHG

## 2023-12-08 DIAGNOSIS — L40.50 PSORIATIC ARTHRITIS (H): ICD-10-CM

## 2023-12-08 DIAGNOSIS — L40.50 PSORIATIC ARTHRITIS (H): Primary | ICD-10-CM

## 2023-12-08 LAB
ALBUMIN SERPL BCG-MCNC: 4.7 G/DL (ref 3.5–5.2)
ALP SERPL-CCNC: 54 U/L (ref 40–150)
ALT SERPL W P-5'-P-CCNC: 67 U/L (ref 0–70)
ANION GAP SERPL CALCULATED.3IONS-SCNC: 8 MMOL/L (ref 7–15)
AST SERPL W P-5'-P-CCNC: 46 U/L (ref 0–45)
BILIRUB DIRECT SERPL-MCNC: 0.37 MG/DL (ref 0–0.3)
BILIRUB SERPL-MCNC: 1.9 MG/DL
BUN SERPL-MCNC: 19.1 MG/DL (ref 6–20)
CALCIUM SERPL-MCNC: 9.7 MG/DL (ref 8.6–10)
CHLORIDE SERPL-SCNC: 99 MMOL/L (ref 98–107)
CREAT SERPL-MCNC: 0.89 MG/DL (ref 0.67–1.17)
DEPRECATED HCO3 PLAS-SCNC: 29 MMOL/L (ref 22–29)
EGFRCR SERPLBLD CKD-EPI 2021: >90 ML/MIN/1.73M2
ERYTHROCYTE [DISTWIDTH] IN BLOOD BY AUTOMATED COUNT: 11 % (ref 10–15)
ERYTHROCYTE [SEDIMENTATION RATE] IN BLOOD BY WESTERGREN METHOD: 4 MM/HR (ref 0–15)
GLUCOSE SERPL-MCNC: 103 MG/DL (ref 70–99)
HCT VFR BLD AUTO: 43.7 % (ref 40–53)
HGB BLD-MCNC: 15.3 G/DL (ref 13.3–17.7)
MCH RBC QN AUTO: 33 PG (ref 26.5–33)
MCHC RBC AUTO-ENTMCNC: 35 G/DL (ref 31.5–36.5)
MCV RBC AUTO: 94 FL (ref 78–100)
PLATELET # BLD AUTO: 165 10E3/UL (ref 150–450)
POTASSIUM SERPL-SCNC: 4.2 MMOL/L (ref 3.4–5.3)
PROT SERPL-MCNC: 7.2 G/DL (ref 6.4–8.3)
RBC # BLD AUTO: 4.63 10E6/UL (ref 4.4–5.9)
SODIUM SERPL-SCNC: 136 MMOL/L (ref 135–145)
WBC # BLD AUTO: 4.4 10E3/UL (ref 4–11)

## 2023-12-08 PROCEDURE — 85652 RBC SED RATE AUTOMATED: CPT | Performed by: PATHOLOGY

## 2023-12-08 PROCEDURE — 99214 OFFICE O/P EST MOD 30 MIN: CPT | Performed by: INTERNAL MEDICINE

## 2023-12-08 PROCEDURE — 99213 OFFICE O/P EST LOW 20 MIN: CPT | Performed by: INTERNAL MEDICINE

## 2023-12-08 PROCEDURE — 82248 BILIRUBIN DIRECT: CPT | Performed by: PATHOLOGY

## 2023-12-08 PROCEDURE — 36415 COLL VENOUS BLD VENIPUNCTURE: CPT | Performed by: PATHOLOGY

## 2023-12-08 PROCEDURE — 85027 COMPLETE CBC AUTOMATED: CPT | Performed by: PATHOLOGY

## 2023-12-08 PROCEDURE — 80053 COMPREHEN METABOLIC PANEL: CPT | Performed by: PATHOLOGY

## 2023-12-08 RX ORDER — SECUKINUMAB 150 MG/ML
150 INJECTION SUBCUTANEOUS
Qty: 1 ML | Refills: 11 | Status: SHIPPED | OUTPATIENT
Start: 2023-12-08

## 2023-12-08 NOTE — LETTER
2023       RE: Jermaine Acosta  [address]  [facility] [address]     Dear Colleague,    Thank you for referring your patient, Jermaine Acosta, to the [facility] RHEUMATOLOGY CLINIC [place] at [facility]. Please see a copy of my visit note below.    [facility]  Rheumatology Clinic  Tay Calderon MD  2023    Name: Jermaine Acosta  MRN: [id number]  Age: 33 year old  : 1990  Referring provider: Pat Bell      Assessment and Plan:  # Seronegative spondyloarthropathy:  Patient relates continued near-complete resolution of symptoms involving the right third finger, left base of thumb, and right forefoot that afflicted him through the 2020. Improvement correlated with start of secukinumab treatment in 2021.  Joint exam is benign.    Data:In 2022, comprehensive metabolic panel was remarkable for mild increase of ALT of 88; total bilirubin was elevated at 2.4, CRP was less than 3, CBC was normal.    Discussion: Inflammatory oligoarthritis remains dramatically improved with a strong temporal association between start of secukinumab and reduction in symptoms.  I recommend continuing anti-IL 17 therapy.  He completed the secukinumab loading schedule in 2021, and has maintained every 28 to 30-day injections of 150 mg since then. He has stretched his dose at times to every 35-37 days with no major side effects.  No injection site reactions or secukinumab dosing cycle dependent symptoms.    # Coccygeal pain: The transient, positional nature of the pain argues against causal association with systemic rheumatic disease. Pain resolved.    We discussed the following plan in detail:    Plan:  1.  Check CBC, LFTs, creatinine, inflammatory marker today.  2. continue to inject cosentyx 150 mg every month    Follow-up: Return in about 12 mos    I was present with the medical student who took the history and acted as a  "she. I have verified the history, reviewed imaging and laboratory data, and personally performed the physical exam. I formulated the assessment and plan.    Tay Calderon M.D.  Staff Rheumatologist,  Health  Pager [phone]      Seen by Rosa Mckenzie, medical student    HPI:   Jermaine Acosta has a history of seronegative spondyloarthropathy; he presents for follow up.  He was last seen 12-22, when lower extremity predominant oligoarthritis was judged improved in conjunction with starting Cosentyx.  Continuation of secukinumab was recommended.    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  Patient had dramatic benefit with use of Humira from 2018 through 2020.  Humira lost efficacy in the summer 2020.  Cosentyx was started in January 2021.    Interval history December 8, 2023  He is doing well overall. He describes minimal joint pain symptoms over the last year. He has had one or two days in the last year where he had some joint pain but it was minimal and he attributed to \"poor diet\" on these days. If he does have pain, it primarily occurs in his right third finger and left thumb and right forefoot at 3rd MTP. He remains very active, with heaving lifting 5 days a week. He has some dry rough skin on his face but otherwise no skin symptoms. He has no morning stiffness.  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    Interval history December 9, 2022    He is doing great. He has a \"twinge\" in base of $R 3rd finger and L forefooot after delaying dose 9 days. Otherwise no skin or joint symptoms.  He has been taking cosentyx every 30 days.  No return of sit bone pain.  He is working out, started playing golf, walking 36 holes per week in summer; playing softball, lifting weights every day.  He noted ~ 15 lb wt loss during the summer; wt has stabilized since.  Work is going well; he is up for tenure at " [employer] this year.      Interval history 09-:    He reports no musculoskeletal symptoms outside of intermittent, mild left greater than right posterior shoulder discomfort.  The discomfort is most noticeable during or shortly after heavy weightlifting episodes in the gym.  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  No injection site reactions with secukinumab.  He has injected every 28 to 30 days 150 mg without fail.    No recurrence of febrile, gastrointestinal illness noted in January 2021.  He is fully vaccinated.    Interval history 03-:    Within 48 hours of receiving the first dose of secukinumab in late January 2021, he experienced near complete resolution of pain, stiffness, and/or swelling in his right third MCP, right midfoot, and base of his left thumb.  Difficult fist formation on the right disappeared within the first week after starting secukinumab.  He denies injection site reactions, fevers chills or sweats.    He had an episode of abdominal pain, diarrhea, and fever 100.8 shortly after returning from a trip to [place] in the last month.  He requests consideration of coronavirus IgG and IgM measurement    Review of Systems:   Pertinent items are noted in HPI or as below, remainder of complete ROS is negative.      No recent problems with hearing or vision. No swallowing problems.   No breathing difficulty, shortness of breath, coughing, or wheezing  No heart burn, indigestion, abdominal pain, nausea, vomiting, diarrhea  No urination problems, no bloody, cloudy urine, no dysuria  No numbing, tingling, weakness  No headaches or confusion  No rashes. No easy bleeding or bruising.     Active Medications:     Current Outpatient Medications   Medication Sig     finasteride (PROSCAR) 5 MG tablet      secukinumab (COSENTYX SENSOREADY PEN) 150 MG/ML Sensoready pen  Inject 1 mL (150 mg) Subcutaneous every 28 days Inject 1 mL (150 mg) Subcutaneous every 28 days Hold for signs of infection, and seek medical attention.Please keep 12/9/22 clinic appt for refills.     secukinumab (COSENTYX SENSOREADY PEN) 150 MG/ML Sensoready pen Inject 1 mL (150 mg) Subcutaneous every 28 days Hold for signs of infection, and seek medical attention.     No current facility-administered medications for this visit.          Allergies:   Pcn [penicillins]      Past Medical History:  Seronegative arthritis - treated with sulfasalazine and oral diclofenac, see note of May 2018     Past Surgical History:  Right ankle fracture; 2012  [place] teeth removal     Family History:   Father - rheumatoid arthritis   GF and mother: cancer     Social History:   No smoking or tobacco use  Moderate alcohol use, 3x/week  Works as  at [employer]     Physical Exam:   BP (!) 148/84   Pulse 81   Wt 86.6 kg (191 lb)   SpO2 99%   BMI 27.80 kg/m     Wt Readings from Last 4 Encounters:   12/08/23 86.6 kg (191 lb)   12/09/22 84.2 kg (185 lb 9.6 oz)   09/17/21 85.8 kg (189 lb 3.2 oz)   03/02/21 85.4 kg (188 lb 4.8 oz)     Constitutional: Muscular, powerfully built, appearing stated age; cooperative  Eyes: Normal EOM, PERRLA, vision, conjunctiva, sclera  ENT: Normal external ears, nose, hearing, lips, teeth, gums.   Neck: No mass or thyroid enlargement  MS: No tenderness at any MCPs, PIPs, or MTPs.  Full range of motion at MCPs, PIPs, and MTPs.  Right and left wrist lack 20 degrees of extension but he is able to do push ups Otherwise, excellent fist formation, and full range of motion in elbows shoulders ankles. No thickened heel cord.  Skin: No nail pitting, alopecia, rash, nodules or lesions. Callous on hands. Rough, dry skin, bilateral side of face.  Psych: Normal judgement, orientation, memory, affect.     Laboratory:       Latest Ref Rng & Units 3/2/2021     4:05 PM 9/17/2021     3:28 PM 12/9/2022     7:58  AM   RHEUM RESULTS   Albumin 3.4 - 5.0 g/dL 4.7  4.6     Albumin 3.5 - 5.2 g/dL   4.8    ALT 10 - 50 U/L 53  70  88    AST 10 - 50 U/L 40  46  48    Creatinine 0.67 - 1.17 mg/dL 1.05  1.07  0.89    CRP Inflammation <5.00 mg/L   <3.00    GFR Estimate If Black >60 mL/min/[1.73_m2] >90      GFR Estimate >60 mL/min/1.73m2 >90  >90  >90    Hematocrit 40.0 - 53.0 % 45.6  41.4  43.4    Hemoglobin 13.3 - 17.7 g/dL 15.4  14.8  15.0    WBC 4.0 - 11.0 10e3/uL 7.0  4.6  4.8    RBC Count 4.40 - 5.90 10e6/uL 4.76  4.41  4.56    RDW 10.0 - 15.0 % 11.2  11.2  11.1    MCHC 31.5 - 36.5 g/dL 33.8  35.7  34.6    MCV 78 - 100 fL 96  94  95    Platelet Count 150 - 450 10e3/uL 196  188  186        Rheumatoid Factor   Date Value Ref Range Status   02/27/2018 <20 <20 IU/mL Final     Cyclic Citrullinated Peptide Antibody, IgG   Date Value Ref Range Status   02/27/2018 1 <7 U/mL Final     Comment:     Negative     Hep B Surface Agn   Date Value Ref Range Status   05/24/2018 Nonreactive NR^Nonreactive Final         Again, thank you for allowing me to participate in the care of your patient.      Sincerely,    Tay Calderon MD

## 2023-12-08 NOTE — PROGRESS NOTES
[facility]  Rheumatology Clinic  Tay Calderon MD  2023    Name: Jermaine Acosta  MRN: [id number]  Age: 33 year old  : 1990  Referring provider: Pat Bell      Assessment and Plan:  # Seronegative spondyloarthropathy:  Patient relates continued near-complete resolution of symptoms involving the right third finger, left base of thumb, and right forefoot that afflicted him through the 2020. Improvement correlated with start of secukinumab treatment in 2021.  Joint exam is benign.    Data:In 2022, comprehensive metabolic panel was remarkable for mild increase of ALT of 88; total bilirubin was elevated at 2.4, CRP was less than 3, CBC was normal.    Discussion: Inflammatory oligoarthritis remains dramatically improved with a strong temporal association between start of secukinumab and reduction in symptoms.  I recommend continuing anti-IL 17 therapy.  He completed the secukinumab loading schedule in 2021, and has maintained every 28 to 30-day injections of 150 mg since then. He has stretched his dose at times to every 35-37 days with no major side effects.  No injection site reactions or secukinumab dosing cycle dependent symptoms.    # Coccygeal pain: The transient, positional nature of the pain argues against causal association with systemic rheumatic disease. Pain resolved.    We discussed the following plan in detail:    Plan:  1.  Check CBC, LFTs, creatinine, inflammatory marker today.  2. continue to inject cosentyx 150 mg every month    Follow-up: Return in about 12 mos    I was present with the medical student who took the history and acted as a scribe. I have verified the history, reviewed imaging and laboratory data, and personally performed the physical exam. I formulated the assessment and plan.    Tay Calderon M.D.  Staff Rheumatologist, [facility]  Pager [phone]      Seen by Rosa Mckenzie, medical student    HPI:   Jermaine Acosta has a history of  "seronegative spondyloarthropathy; he presents for follow up.  He was last seen 12-22, when lower extremity predominant oligoarthritis was judged improved in conjunction with starting Cosentyx.  Continuation of secukinumab was recommended.    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  Patient had dramatic benefit with use of Humira from 2018 through 2020.  Humira lost efficacy in the summer 2020.  Cosentyx was started in January 2021.    Interval history December 8, 2023  He is doing well overall. He describes minimal joint pain symptoms over the last year. He has had one or two days in the last year where he had some joint pain but it was minimal and he attributed to \"poor diet\" on these days. If he does have pain, it primarily occurs in his right third finger and left thumb and right forefoot at 3rd MTP. He remains very active, with heaving lifting 5 days a week. He has some dry rough skin on his face but otherwise no skin symptoms. He has no morning stiffness.  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    Interval history December 9, 2022    He is doing great. He has a \"twinge\" in base of $R 3rd finger and L forefooot after delaying dose 9 days. Otherwise no skin or joint symptoms.  He has been taking cosentyx every 30 days.  No return of sit bone pain.  He is working out, started playing golf, walking 36 holes per week in summer; playing softball, lifting weights every day.  He noted ~ 15 lb wt loss during the summer; wt has stabilized since.  Work is going well; he is up for tenure at [employer] this year.      Interval history 09-:    He reports no musculoskeletal symptoms outside of intermittent, mild left greater than right posterior shoulder discomfort.  The discomfort is most noticeable during or shortly after heavy weightlifting episodes in the gym.  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  No injection site reactions with secukinumab.  He has injected every 28 to 30 days 150 mg without fail.    No recurrence of febrile, gastrointestinal illness noted in January 2021.  He is fully vaccinated.    Interval history 03-:    Within 48 hours of receiving the first dose of secukinumab in late January 2021, he experienced near complete resolution of pain, stiffness, and/or swelling in his right third MCP, right midfoot, and base of his left thumb.  Difficult fist formation on the right disappeared within the first week after starting secukinumab.  He denies injection site reactions, fevers chills or sweats.    He had an episode of abdominal pain, diarrhea, and fever 100.8 shortly after returning from a trip to [place] in the last month.  He requests consideration of coronavirus IgG and IgM measurement    Review of Systems:   Pertinent items are noted in HPI or as below, remainder of complete ROS is negative.      No recent problems with hearing or vision. No swallowing problems.   No breathing difficulty, shortness of breath, coughing, or wheezing  No heart burn, indigestion, abdominal pain, nausea, vomiting, diarrhea  No urination problems, no bloody, cloudy urine, no dysuria  No numbing, tingling, weakness  No headaches or confusion  No rashes. No easy bleeding or bruising.     Active Medications:     Current Outpatient Medications   Medication Sig    finasteride (PROSCAR) 5 MG tablet     secukinumab (COSENTYX SENSOREADY PEN) 150 MG/ML Sensoready pen Inject 1 mL (150 mg) Subcutaneous every 28 days Inject 1 mL (150 mg) Subcutaneous every 28 days Hold for signs of infection, and seek medical attention.Please keep 12/9/22 clinic appt for refills.    secukinumab (COSENTYX SENSOREADY PEN) 150 MG/ML Sensoready pen Inject 1 mL (150 mg) Subcutaneous every 28 days Hold  for signs of infection, and seek medical attention.     No current facility-administered medications for this visit.          Allergies:   Pcn [penicillins]      Past Medical History:  Seronegative arthritis - treated with sulfasalazine and oral diclofenac, see note of May 2018     Past Surgical History:  Right ankle fracture; 2012  Flushing teeth removal     Family History:   Father - rheumatoid arthritis   GF and mother: cancer     Social History:   No smoking or tobacco use  Moderate alcohol use, 3x/week  Works as  at [employer]     Physical Exam:   BP (!) 148/84   Pulse 81   Wt 86.6 kg (191 lb)   SpO2 99%   BMI 27.80 kg/m     Wt Readings from Last 4 Encounters:   12/08/23 86.6 kg (191 lb)   12/09/22 84.2 kg (185 lb 9.6 oz)   09/17/21 85.8 kg (189 lb 3.2 oz)   03/02/21 85.4 kg (188 lb 4.8 oz)     Constitutional: Muscular, powerfully built, appearing stated age; cooperative  Eyes: Normal EOM, PERRLA, vision, conjunctiva, sclera  ENT: Normal external ears, nose, hearing, lips, teeth, gums.   Neck: No mass or thyroid enlargement  MS: No tenderness at any MCPs, PIPs, or MTPs.  Full range of motion at MCPs, PIPs, and MTPs.  Right and left wrist lack 20 degrees of extension but he is able to do push ups Otherwise, excellent fist formation, and full range of motion in elbows shoulders ankles. No thickened heel cord.  Skin: No nail pitting, alopecia, rash, nodules or lesions. Callous on hands. Rough, dry skin, bilateral side of face.  Psych: Normal judgement, orientation, memory, affect.     Laboratory:       Latest Ref Rng & Units 3/2/2021     4:05 PM 9/17/2021     3:28 PM 12/9/2022     7:58 AM   RHEUM RESULTS   Albumin 3.4 - 5.0 g/dL 4.7  4.6     Albumin 3.5 - 5.2 g/dL   4.8    ALT 10 - 50 U/L 53  70  88    AST 10 - 50 U/L 40  46  48    Creatinine 0.67 - 1.17 mg/dL 1.05  1.07  0.89    CRP Inflammation <5.00 mg/L   <3.00    GFR Estimate If Black >60 mL/min/[1.73_m2] >90      GFR Estimate >60  mL/min/1.73m2 >90  >90  >90    Hematocrit 40.0 - 53.0 % 45.6  41.4  43.4    Hemoglobin 13.3 - 17.7 g/dL 15.4  14.8  15.0    WBC 4.0 - 11.0 10e3/uL 7.0  4.6  4.8    RBC Count 4.40 - 5.90 10e6/uL 4.76  4.41  4.56    RDW 10.0 - 15.0 % 11.2  11.2  11.1    MCHC 31.5 - 36.5 g/dL 33.8  35.7  34.6    MCV 78 - 100 fL 96  94  95    Platelet Count 150 - 450 10e3/uL 196  188  186        Rheumatoid Factor   Date Value Ref Range Status   02/27/2018 <20 <20 IU/mL Final     Cyclic Citrullinated Peptide Antibody, IgG   Date Value Ref Range Status   02/27/2018 1 <7 U/mL Final     Comment:     Negative     Hep B Surface Agn   Date Value Ref Range Status   05/24/2018 Nonreactive NR^Nonreactive Final

## 2024-05-14 ENCOUNTER — TELEPHONE (OUTPATIENT)
Dept: RHEUMATOLOGY | Facility: CLINIC | Age: 34
End: 2024-05-14
Payer: COMMERCIAL

## 2024-05-14 NOTE — TELEPHONE ENCOUNTER
PA Initiation    Medication: COSENTYX SENSOREADY  MG/ML SC SOAJ  Insurance Company: [employer] ([facility]) - Phone [phone] Fax [phone]  Pharmacy Filling the Rx: [facility] - [place], [place] - [address]  Filling Pharmacy Phone:    Filling Pharmacy Fax:    Start Date: 5/14/2024    Key: [id number]

## 2024-05-16 NOTE — TELEPHONE ENCOUNTER
Prior Authorization Approval    Medication: COSENTYX SENSOREADY  MG/ML SC SOAJ  Authorization Effective Date: 5/16/2024  Authorization Expiration Date: 5/14/2025  Approved Dose/Quantity: 1/28d   Reference #: Key: [id number]   Insurance Company: [employer] ([facility]) - Phone [phone] Fax [phone]  Expected CoPay: $    CoPay Card Available:      Financial Assistance Needed: no  Which Pharmacy is filling the prescription: [facility] - [place], [place] - [address]   Pharmacy Notified: yes  Patient Notified: yes

## 2024-07-14 ENCOUNTER — HEALTH MAINTENANCE LETTER (OUTPATIENT)
Age: 34
End: 2024-07-14

## 2024-07-15 RX ORDER — FINASTERIDE 5 MG/1
TABLET, FILM COATED ORAL
Refills: 0 | Status: CANCELLED | OUTPATIENT
Start: 2024-07-15

## 2024-07-15 NOTE — TELEPHONE ENCOUNTER
Placed call to patient to explain that Dr. Calderon has not prescribed finasteride previously and that he should consult the provider who originally ordered the medication. Patient reports he found the name of the ordering provider on the medication bottle and he will update [facility] with provider's name.  Christine Yeung RN  Adult Rheumatology Clinic

## 2024-07-15 NOTE — TELEPHONE ENCOUNTER
Medication Question or Refill    Contacts       Contact Date/Time Type Contact Phone/Fax    07/15/2024 09:36 AM CDT Phone (Incoming) Raul Acosta (Self) [phone] ()            What medication are you calling about (include dose and sig)?: finasteride (PROSCAR) 5 MG tablet     Preferred Pharmacy:  [facility]       Controlled Substance Agreement on file:   CSA -- Patient Level:    CSA: None found at the patient level.       Who prescribed the medication?: Pt can't make out name on bottle     Do you need a refill? Yes    When did you use the medication last? As prescribed     Patient offered an appointment? No    Do you have any questions or concerns?  No      Could we send this information to you in [employer] or would you prefer to receive a phone call?:   Patient would like to be contacted via [employer]

## 2024-12-13 ENCOUNTER — LAB (OUTPATIENT)
Dept: LAB | Facility: CLINIC | Age: 34
End: 2024-12-13
Attending: INTERNAL MEDICINE
Payer: COMMERCIAL

## 2024-12-13 DIAGNOSIS — M13.80 SERONEGATIVE ARTHRITIS: ICD-10-CM

## 2024-12-13 LAB
ALBUMIN SERPL BCG-MCNC: 4.7 G/DL (ref 3.5–5.2)
ALP SERPL-CCNC: 50 U/L (ref 40–150)
ALT SERPL W P-5'-P-CCNC: 56 U/L (ref 0–70)
ANION GAP SERPL CALCULATED.3IONS-SCNC: 10 MMOL/L (ref 7–15)
AST SERPL W P-5'-P-CCNC: 42 U/L (ref 0–45)
BILIRUB SERPL-MCNC: 2 MG/DL
BUN SERPL-MCNC: 17.5 MG/DL (ref 6–20)
CALCIUM SERPL-MCNC: 9.5 MG/DL (ref 8.8–10.4)
CHLORIDE SERPL-SCNC: 98 MMOL/L (ref 98–107)
CREAT SERPL-MCNC: 1 MG/DL (ref 0.67–1.17)
EGFRCR SERPLBLD CKD-EPI 2021: >90 ML/MIN/1.73M2
ERYTHROCYTE [DISTWIDTH] IN BLOOD BY AUTOMATED COUNT: 10.9 % (ref 10–15)
ERYTHROCYTE [SEDIMENTATION RATE] IN BLOOD BY WESTERGREN METHOD: 10 MM/HR (ref 0–15)
GLUCOSE SERPL-MCNC: 100 MG/DL (ref 70–99)
HCO3 SERPL-SCNC: 29 MMOL/L (ref 22–29)
HCT VFR BLD AUTO: 42.2 % (ref 40–53)
HGB BLD-MCNC: 14.8 G/DL (ref 13.3–17.7)
MCH RBC QN AUTO: 33.2 PG (ref 26.5–33)
MCHC RBC AUTO-ENTMCNC: 35.1 G/DL (ref 31.5–36.5)
MCV RBC AUTO: 95 FL (ref 78–100)
PLATELET # BLD AUTO: 172 10E3/UL (ref 150–450)
POTASSIUM SERPL-SCNC: 4.1 MMOL/L (ref 3.4–5.3)
PROT SERPL-MCNC: 7 G/DL (ref 6.4–8.3)
RBC # BLD AUTO: 4.46 10E6/UL (ref 4.4–5.9)
SODIUM SERPL-SCNC: 137 MMOL/L (ref 135–145)
WBC # BLD AUTO: 4.4 10E3/UL (ref 4–11)

## 2024-12-13 PROCEDURE — 85027 COMPLETE CBC AUTOMATED: CPT | Performed by: PATHOLOGY

## 2024-12-13 PROCEDURE — 80053 COMPREHEN METABOLIC PANEL: CPT | Performed by: PATHOLOGY

## 2024-12-13 PROCEDURE — 36415 COLL VENOUS BLD VENIPUNCTURE: CPT | Performed by: PATHOLOGY

## 2024-12-13 PROCEDURE — 85652 RBC SED RATE AUTOMATED: CPT | Performed by: PATHOLOGY

## 2025-02-03 NOTE — PROGRESS NOTES
"Medication Therapy Management (MTM) Encounter    ASSESSMENT:                            Medication Adherence/Access: See below for considerations.    Spondyloarthritis/psoriatic arthritis: After I spoke to the [employer] program, recommending that the patient reach out to his insurance plan to see if he has reached his out-of-pocket maximum and then follow-up with the Cosentyx connect program himself if that is not the case.    PLAN:                            I would recommend calling your insurance company to ask them if you have met your out-of-pocket maximum for prescriptions.  -There will be a customer service number on the back of your insurance card where you can call to confirm this.  -I would also ask your insurance plan to confirm if your co-pays on prescriptions are $0 after you reach your out-of-pocket maximum.  -When I spoke to the representative at the [employer] program she said that what may be going on is that $4000 that was used from the Cosentyx card may have been put towards your entire maximum out-of-pocket cost for the year.    2. If your insurance tells you that you have not reached your out-of-pocket maximum or have not contributed anything to your deductible, then I would recommend contacting the Cosentyx connect program at [phone] to ask them the following questions:  -Ask them to look at your claims on the co-pay card from last year to see if in January they charged for thousand dollars, and then had lower amounts taken out later in the year.  I am wondering if the co-pay card may be changed how they structure the payments.  -If that $4000 amount that was taken out of the co-pay card is not going towards your deductible/out-of-pocket maximum I would asked the Cosentyx connect program if they have a \"rebate or debit card option\".  In my experience typically this is one way to get the funds from the program to count towards your deductibles or out-of-pocket maximum's with " insurance    Follow-up: [facility] follow-up in 6 months, around 8/4/2025.    SUBJECTIVE/OBJECTIVE:                          Raul Acosta is a 34 year old male seen for an initial visit. He was referred to me from Dr. Calderon.      Reason for visit: Referral to help with Cosentyx cost concerns.    Allergies/ADRs: Reviewed in chart  Past Medical History: Reviewed in chart  Tobacco: He reports that he has never smoked. He has never used smokeless tobacco.  Alcohol: Not discussed today    Medication Adherence/Access: No issues with forgetting or missing doses of medication  -Is having issues with the co-pay of Cosentyx, is not affordable anymore.  Getting this at [facility].  In January his copay was $0 but he got an email from Cosentyx connect stating that he has used up $4000 of his annual benefit of 15,000 through the program.  If he is going to be using $4000 each month from that program he will run out of funds by April.  He cannot afford a $4000 co-pay.  From Cosentyx connect they sent him an email, annual benefit, has used total  - He has been using the copay card which helped a lot.  This past year the cost went up a lot.  - His insurance hasn't changed, still with [employer].  Deductible - Ind/family - 3000 (4000$) (he isn't sure which one is this years deductible, has two cards in his wallet)  Out of pocket maximum- 3000 or 4000.    - I tried calling the Cosentyx connect program after I spoke to the patient to get some more details about if there are options for rebate program.  The representative I talked to said that she was not able to look up specific claims for me, they have to talk to the patient for that.  She was able to give me some general information about the program and if the first month the program was charged for thousand dollars she said that this is likely a deductible getting applied or there out-of-pocket maximum.  She let me know that their program is able to go towards  deductibles and out-of-pocket maximum's.    Spondyloarthritis/psoriatic arthritis:  -Cosentyx 150 mg every 28 days - he takes this every 5.5 weeks.  Patient recently met with Dr. Calderon.  His arthritis symptoms have significantly improved with the start of Cosentyx.  He completed the loading dose of Cosentyx in January 2021.  Has at times stretched his Cosentyx dose to every 35 to 37 days without increased symptoms or flare ups.  Dr. Calderon is aware of this and okay with him taking it at this dosing frequency as it is managing his symptoms well    Previous therapies:   Humira - lost efficacy over time.    Last lab monitoring completed: 12/13/2024.  Creatinine, LFTs, CBC within normal limits.    Pre-Biologic Screening:   Hep B Surface Antigen Non-reactive (5/24/2018)    Hep C Antibody  Non-reactive (5/24/2018)    Quantiferon TB Gold Negative (5/24/2018)      ----------------    I spent 30 minutes with this patient today. All changes were made via collaborative practice agreement with Tay Calderon.     A summary of these recommendations was sent via [employer].    Juanita Blanca, LuceroD  Medication Therapy Management Pharmacist  [facility] Rhe[facility]ology [facility]/    Telemedicine Visit Details  The patient's medications can be safely assessed via a telemedicine encounter.  Type of service:  Telephone visit  Originating Location (pt. Location): Home    Distant Location (provider location):  Off-site  Start Time: 1:30 PM  End Time: 2 PM     Medication Therapy Recommendations  No medication therapy recommendations to display

## 2025-02-04 ENCOUNTER — VIRTUAL VISIT (OUTPATIENT)
Dept: PHARMACY | Facility: CLINIC | Age: 35
End: 2025-02-04
Attending: INTERNAL MEDICINE
Payer: COMMERCIAL

## 2025-02-04 DIAGNOSIS — M47.819 SPONDYLOARTHRITIS: Primary | ICD-10-CM

## 2025-02-04 DIAGNOSIS — L40.50 PSORIATIC ARTHRITIS (H): ICD-10-CM

## 2025-03-03 RX ORDER — FINASTERIDE 5 MG/1
TABLET, FILM COATED ORAL
Refills: 0 | Status: CANCELLED | OUTPATIENT
Start: 2025-03-03

## 2025-03-03 NOTE — TELEPHONE ENCOUNTER
Patient called stating for a refill of this medication. He stated that he has been getting refills from . Informed that if there needs to be any updates he will be reached through [employer].